# Patient Record
Sex: FEMALE | Race: BLACK OR AFRICAN AMERICAN | NOT HISPANIC OR LATINO
[De-identification: names, ages, dates, MRNs, and addresses within clinical notes are randomized per-mention and may not be internally consistent; named-entity substitution may affect disease eponyms.]

---

## 2019-05-16 PROBLEM — Z00.00 ENCOUNTER FOR PREVENTIVE HEALTH EXAMINATION: Status: ACTIVE | Noted: 2019-05-16

## 2019-05-22 ENCOUNTER — RESULT REVIEW (OUTPATIENT)
Age: 24
End: 2019-05-22

## 2019-05-22 ENCOUNTER — APPOINTMENT (OUTPATIENT)
Dept: OBGYN | Facility: CLINIC | Age: 24
End: 2019-05-22
Payer: MEDICAID

## 2019-05-22 ENCOUNTER — OUTPATIENT (OUTPATIENT)
Dept: OUTPATIENT SERVICES | Facility: HOSPITAL | Age: 24
LOS: 1 days | End: 2019-05-22
Payer: MEDICAID

## 2019-05-22 ENCOUNTER — NON-APPOINTMENT (OUTPATIENT)
Age: 24
End: 2019-05-22

## 2019-05-22 VITALS
DIASTOLIC BLOOD PRESSURE: 77 MMHG | HEIGHT: 65 IN | BODY MASS INDEX: 40.82 KG/M2 | SYSTOLIC BLOOD PRESSURE: 117 MMHG | WEIGHT: 245 LBS

## 2019-05-22 DIAGNOSIS — Z86.59 PERSONAL HISTORY OF OTHER MENTAL AND BEHAVIORAL DISORDERS: ICD-10-CM

## 2019-05-22 DIAGNOSIS — Z34.00 ENCOUNTER FOR SUPERVISION OF NORMAL FIRST PREGNANCY, UNSPECIFIED TRIMESTER: ICD-10-CM

## 2019-05-22 DIAGNOSIS — Z87.09 PERSONAL HISTORY OF OTHER DISEASES OF THE RESPIRATORY SYSTEM: ICD-10-CM

## 2019-05-22 PROCEDURE — 83020 HEMOGLOBIN ELECTROPHORESIS: CPT

## 2019-05-22 PROCEDURE — 99203 OFFICE O/P NEW LOW 30 MIN: CPT | Mod: NC

## 2019-05-22 PROCEDURE — 87340 HEPATITIS B SURFACE AG IA: CPT

## 2019-05-22 PROCEDURE — 81001 URINALYSIS AUTO W/SCOPE: CPT

## 2019-05-22 PROCEDURE — 36415 COLL VENOUS BLD VENIPUNCTURE: CPT | Mod: NC

## 2019-05-22 PROCEDURE — 86787 VARICELLA-ZOSTER ANTIBODY: CPT

## 2019-05-22 PROCEDURE — 86762 RUBELLA ANTIBODY: CPT

## 2019-05-22 PROCEDURE — 83036 HEMOGLOBIN GLYCOSYLATED A1C: CPT

## 2019-05-22 PROCEDURE — 81099 UNLISTED URINALYSIS PX: CPT | Mod: NC

## 2019-05-22 PROCEDURE — 81220 CFTR GENE COM VARIANTS: CPT

## 2019-05-22 PROCEDURE — 83655 ASSAY OF LEAD: CPT

## 2019-05-22 PROCEDURE — 81025 URINE PREGNANCY TEST: CPT | Mod: NC

## 2019-05-22 PROCEDURE — 83020 HEMOGLOBIN ELECTROPHORESIS: CPT | Mod: 26

## 2019-05-22 PROCEDURE — 86780 TREPONEMA PALLIDUM: CPT

## 2019-05-22 PROCEDURE — 86850 RBC ANTIBODY SCREEN: CPT

## 2019-05-22 PROCEDURE — 87086 URINE CULTURE/COLONY COUNT: CPT

## 2019-05-22 PROCEDURE — 86901 BLOOD TYPING SEROLOGIC RH(D): CPT

## 2019-05-22 PROCEDURE — 81025 URINE PREGNANCY TEST: CPT

## 2019-05-22 PROCEDURE — G0463: CPT

## 2019-05-22 PROCEDURE — 36415 COLL VENOUS BLD VENIPUNCTURE: CPT

## 2019-05-22 PROCEDURE — 81003 URINALYSIS AUTO W/O SCOPE: CPT

## 2019-05-22 PROCEDURE — 87389 HIV-1 AG W/HIV-1&-2 AB AG IA: CPT

## 2019-05-22 PROCEDURE — 86480 TB TEST CELL IMMUN MEASURE: CPT

## 2019-05-22 PROCEDURE — 86900 BLOOD TYPING SEROLOGIC ABO: CPT

## 2019-05-22 RX ORDER — ALBUTEROL 90 MCG
AEROSOL (GRAM) INHALATION
Refills: 0 | Status: ACTIVE | COMMUNITY

## 2019-05-22 RX ORDER — PNV NO.95/FERROUS FUM/FOLIC AC 28MG-0.8MG
TABLET ORAL
Refills: 0 | Status: ACTIVE | COMMUNITY

## 2019-05-23 LAB
APPEARANCE UR: CLEAR — SIGNIFICANT CHANGE UP
BASOPHILS # BLD AUTO: 0.06 K/UL — SIGNIFICANT CHANGE UP (ref 0–0.2)
BASOPHILS NFR BLD AUTO: 0.8 % — SIGNIFICANT CHANGE UP (ref 0–2)
BILIRUB UR-MCNC: NEGATIVE — SIGNIFICANT CHANGE UP
COLOR SPEC: YELLOW — SIGNIFICANT CHANGE UP
CULTURE RESULTS: SIGNIFICANT CHANGE UP
DIFF PNL FLD: NEGATIVE — SIGNIFICANT CHANGE UP
EOSINOPHIL # BLD AUTO: 0.04 K/UL — SIGNIFICANT CHANGE UP (ref 0–0.5)
EOSINOPHIL NFR BLD AUTO: 0.5 % — SIGNIFICANT CHANGE UP (ref 0–6)
GLUCOSE UR QL: NEGATIVE — SIGNIFICANT CHANGE UP
HBA1C BLD-MCNC: 5.1 % — SIGNIFICANT CHANGE UP (ref 4–5.6)
HBV SURFACE AG SER-ACNC: SIGNIFICANT CHANGE UP
HCT VFR BLD CALC: 35.3 % — SIGNIFICANT CHANGE UP (ref 34.5–45)
HGB BLD-MCNC: 10.4 G/DL — LOW (ref 11.5–15.5)
HIV 1+2 AB+HIV1 P24 AG SERPL QL IA: SIGNIFICANT CHANGE UP
IMM GRANULOCYTES NFR BLD AUTO: 0.1 % — SIGNIFICANT CHANGE UP (ref 0–1.5)
KETONES UR-MCNC: ABNORMAL
LEUKOCYTE ESTERASE UR-ACNC: ABNORMAL
LYMPHOCYTES # BLD AUTO: 1.43 K/UL — SIGNIFICANT CHANGE UP (ref 1–3.3)
LYMPHOCYTES # BLD AUTO: 19.4 % — SIGNIFICANT CHANGE UP (ref 13–44)
MCHC RBC-ENTMCNC: 26.1 PG — LOW (ref 27–34)
MCHC RBC-ENTMCNC: 29.5 GM/DL — LOW (ref 32–36)
MCV RBC AUTO: 88.5 FL — SIGNIFICANT CHANGE UP (ref 80–100)
MONOCYTES # BLD AUTO: 0.77 K/UL — SIGNIFICANT CHANGE UP (ref 0–0.9)
MONOCYTES NFR BLD AUTO: 10.5 % — SIGNIFICANT CHANGE UP (ref 2–14)
NEUTROPHILS # BLD AUTO: 5.05 K/UL — SIGNIFICANT CHANGE UP (ref 1.8–7.4)
NEUTROPHILS NFR BLD AUTO: 68.7 % — SIGNIFICANT CHANGE UP (ref 43–77)
NITRITE UR-MCNC: NEGATIVE — SIGNIFICANT CHANGE UP
PH UR: 6 — SIGNIFICANT CHANGE UP (ref 5–8)
PLATELET # BLD AUTO: 381 K/UL — SIGNIFICANT CHANGE UP (ref 150–400)
PROT UR-MCNC: SIGNIFICANT CHANGE UP
RBC # BLD: 3.99 M/UL — SIGNIFICANT CHANGE UP (ref 3.8–5.2)
RBC # FLD: 19.3 % — HIGH (ref 10.3–14.5)
RUBV IGG SER-ACNC: 2.3 INDEX — SIGNIFICANT CHANGE UP
RUBV IGG SER-IMP: POSITIVE — SIGNIFICANT CHANGE UP
SP GR SPEC: 1.03 — HIGH (ref 1.01–1.02)
SPECIMEN SOURCE: SIGNIFICANT CHANGE UP
T PALLIDUM AB TITR SER: NEGATIVE — SIGNIFICANT CHANGE UP
UROBILINOGEN FLD QL: SIGNIFICANT CHANGE UP
VZV IGG FLD QL IA: 746.5 INDEX — SIGNIFICANT CHANGE UP
VZV IGG FLD QL IA: POSITIVE — SIGNIFICANT CHANGE UP
WBC # BLD: 7.36 K/UL — SIGNIFICANT CHANGE UP (ref 3.8–10.5)
WBC # FLD AUTO: 7.36 K/UL — SIGNIFICANT CHANGE UP (ref 3.8–10.5)

## 2019-05-24 DIAGNOSIS — Z3A.09 9 WEEKS GESTATION OF PREGNANCY: ICD-10-CM

## 2019-05-24 DIAGNOSIS — Z34.01 ENCOUNTER FOR SUPERVISION OF NORMAL FIRST PREGNANCY, FIRST TRIMESTER: ICD-10-CM

## 2019-05-24 LAB
HEMOGLOBIN INTERPRETATION: SIGNIFICANT CHANGE UP
HGB A MFR BLD: 97.6 % — SIGNIFICANT CHANGE UP (ref 95.8–98)
HGB A2 MFR BLD: 2.4 % — SIGNIFICANT CHANGE UP (ref 2–3.2)
LEAD BLD-MCNC: SIGNIFICANT CHANGE UP UG/DL (ref 0–4)

## 2019-05-28 LAB — CYSTIC FIBROSIS EXPANDED PANEL: SIGNIFICANT CHANGE UP

## 2019-05-29 LAB
GAMMA INTERFERON BACKGROUND BLD IA-ACNC: 0.01 IU/ML — SIGNIFICANT CHANGE UP
M TB IFN-G BLD-IMP: NEGATIVE — SIGNIFICANT CHANGE UP
M TB IFN-G CD4+ BCKGRND COR BLD-ACNC: 0 IU/ML — SIGNIFICANT CHANGE UP
M TB IFN-G CD4+CD8+ BCKGRND COR BLD-ACNC: 0.01 IU/ML — SIGNIFICANT CHANGE UP
QUANT TB PLUS MITOGEN MINUS NIL: 8.45 IU/ML — SIGNIFICANT CHANGE UP

## 2019-06-14 ENCOUNTER — ASOB RESULT (OUTPATIENT)
Age: 24
End: 2019-06-14

## 2019-06-14 ENCOUNTER — APPOINTMENT (OUTPATIENT)
Dept: ANTEPARTUM | Facility: CLINIC | Age: 24
End: 2019-06-14
Payer: MEDICAID

## 2019-06-14 PROCEDURE — 76801 OB US < 14 WKS SINGLE FETUS: CPT

## 2019-06-14 PROCEDURE — 76813 OB US NUCHAL MEAS 1 GEST: CPT

## 2019-06-14 PROCEDURE — 36416 COLLJ CAPILLARY BLOOD SPEC: CPT

## 2019-06-19 ENCOUNTER — OUTPATIENT (OUTPATIENT)
Dept: OUTPATIENT SERVICES | Facility: HOSPITAL | Age: 24
LOS: 1 days | End: 2019-06-19
Payer: MEDICAID

## 2019-06-19 ENCOUNTER — APPOINTMENT (OUTPATIENT)
Dept: OBGYN | Facility: CLINIC | Age: 24
End: 2019-06-19
Payer: MEDICAID

## 2019-06-19 ENCOUNTER — NON-APPOINTMENT (OUTPATIENT)
Age: 24
End: 2019-06-19

## 2019-06-19 VITALS
DIASTOLIC BLOOD PRESSURE: 74 MMHG | WEIGHT: 248 LBS | HEIGHT: 65 IN | BODY MASS INDEX: 41.32 KG/M2 | SYSTOLIC BLOOD PRESSURE: 119 MMHG

## 2019-06-19 DIAGNOSIS — Z3A.09 9 WEEKS GESTATION OF PREGNANCY: ICD-10-CM

## 2019-06-19 DIAGNOSIS — Z34.00 ENCOUNTER FOR SUPERVISION OF NORMAL FIRST PREGNANCY, UNSPECIFIED TRIMESTER: ICD-10-CM

## 2019-06-19 PROCEDURE — 87086 URINE CULTURE/COLONY COUNT: CPT

## 2019-06-19 PROCEDURE — 99213 OFFICE O/P EST LOW 20 MIN: CPT | Mod: NC

## 2019-06-19 PROCEDURE — 81420 FETAL CHRMOML ANEUPLOIDY: CPT

## 2019-06-19 PROCEDURE — 81099 UNLISTED URINALYSIS PX: CPT | Mod: NC

## 2019-06-19 PROCEDURE — 36415 COLL VENOUS BLD VENIPUNCTURE: CPT | Mod: NC

## 2019-06-19 PROCEDURE — G0463: CPT

## 2019-06-19 PROCEDURE — 81003 URINALYSIS AUTO W/O SCOPE: CPT

## 2019-06-19 PROCEDURE — 36415 COLL VENOUS BLD VENIPUNCTURE: CPT

## 2019-06-20 LAB
CULTURE RESULTS: NO GROWTH — SIGNIFICANT CHANGE UP
SPECIMEN SOURCE: SIGNIFICANT CHANGE UP

## 2019-06-21 DIAGNOSIS — Z3A.13 13 WEEKS GESTATION OF PREGNANCY: ICD-10-CM

## 2019-06-21 DIAGNOSIS — Z34.01 ENCOUNTER FOR SUPERVISION OF NORMAL FIRST PREGNANCY, FIRST TRIMESTER: ICD-10-CM

## 2019-06-24 LAB
CHR 21 TS BLD/T QL: NEGATIVE — SIGNIFICANT CHANGE UP
CHROMOSOME 13 ANEUPLOIDY: NEGATIVE — SIGNIFICANT CHANGE UP
CHROMOSOME 18 ANEUPLOIDY: NEGATIVE — SIGNIFICANT CHANGE UP
SEX CHROMOSOME ANALYSIS: SIGNIFICANT CHANGE UP

## 2019-07-16 ENCOUNTER — APPOINTMENT (OUTPATIENT)
Dept: OBGYN | Facility: CLINIC | Age: 24
End: 2019-07-16

## 2019-07-19 ENCOUNTER — APPOINTMENT (OUTPATIENT)
Dept: OBGYN | Facility: CLINIC | Age: 24
End: 2019-07-19
Payer: MEDICAID

## 2019-07-19 ENCOUNTER — OUTPATIENT (OUTPATIENT)
Dept: OUTPATIENT SERVICES | Facility: HOSPITAL | Age: 24
LOS: 1 days | End: 2019-07-19
Payer: MEDICAID

## 2019-07-19 ENCOUNTER — NON-APPOINTMENT (OUTPATIENT)
Age: 24
End: 2019-07-19

## 2019-07-19 VITALS
RESPIRATION RATE: 18 BRPM | DIASTOLIC BLOOD PRESSURE: 78 MMHG | WEIGHT: 247 LBS | HEART RATE: 85 BPM | BODY MASS INDEX: 41.15 KG/M2 | OXYGEN SATURATION: 100 % | HEIGHT: 65 IN | SYSTOLIC BLOOD PRESSURE: 118 MMHG

## 2019-07-19 DIAGNOSIS — Z34.00 ENCOUNTER FOR SUPERVISION OF NORMAL FIRST PREGNANCY, UNSPECIFIED TRIMESTER: ICD-10-CM

## 2019-07-19 DIAGNOSIS — E66.9 OBESITY, UNSPECIFIED: ICD-10-CM

## 2019-07-19 DIAGNOSIS — Z34.01 ENCOUNTER FOR SUPERVISION OF NORMAL FIRST PREGNANCY, FIRST TRIMESTER: ICD-10-CM

## 2019-07-19 PROCEDURE — 84702 CHORIONIC GONADOTROPIN TEST: CPT

## 2019-07-19 PROCEDURE — 84704 HCG FREE BETACHAIN TEST: CPT

## 2019-07-19 PROCEDURE — 81003 URINALYSIS AUTO W/O SCOPE: CPT

## 2019-07-19 PROCEDURE — 82677 ASSAY OF ESTRIOL: CPT

## 2019-07-19 PROCEDURE — 82105 ALPHA-FETOPROTEIN SERUM: CPT

## 2019-07-19 PROCEDURE — 86336 INHIBIN A: CPT

## 2019-07-19 PROCEDURE — G0463: CPT

## 2019-07-19 PROCEDURE — 99213 OFFICE O/P EST LOW 20 MIN: CPT | Mod: NC

## 2019-07-19 PROCEDURE — 36415 COLL VENOUS BLD VENIPUNCTURE: CPT

## 2019-07-19 PROCEDURE — 36415 COLL VENOUS BLD VENIPUNCTURE: CPT | Mod: NC

## 2019-07-19 RX ORDER — PRENATAL VIT NO.130/IRON/FOLIC 27MG-0.8MG
28-0.8 TABLET ORAL DAILY
Qty: 30 | Refills: 1 | Status: ACTIVE | COMMUNITY
Start: 2019-07-19 | End: 1900-01-01

## 2019-07-22 LAB
1ST TRIMESTER DATA: SIGNIFICANT CHANGE UP
2ND TRIMESTER DATA: SIGNIFICANT CHANGE UP
AFP AMN-MCNC: SIGNIFICANT CHANGE UP
AFP SERPL-ACNC: SIGNIFICANT CHANGE UP
B-HCG FREE SERPL-MCNC: SIGNIFICANT CHANGE UP
B-HCG FREE SERPL-MCNC: SIGNIFICANT CHANGE UP
CLINICAL BIOCHEMIST REVIEW: SIGNIFICANT CHANGE UP
DEMOGRAPHIC DATA: SIGNIFICANT CHANGE UP
INHIBIN A SERPL-MCNC: SIGNIFICANT CHANGE UP
NT: SIGNIFICANT CHANGE UP
PAPP-A SERPL-ACNC: SIGNIFICANT CHANGE UP
SCREEN-FOOTER: SIGNIFICANT CHANGE UP
U ESTRIOL SERPL-SCNC: SIGNIFICANT CHANGE UP

## 2019-07-23 DIAGNOSIS — Z34.02 ENCOUNTER FOR SUPERVISION OF NORMAL FIRST PREGNANCY, SECOND TRIMESTER: ICD-10-CM

## 2019-07-23 DIAGNOSIS — Z3A.18 18 WEEKS GESTATION OF PREGNANCY: ICD-10-CM

## 2019-07-23 DIAGNOSIS — E66.9 OBESITY, UNSPECIFIED: ICD-10-CM

## 2019-08-13 ENCOUNTER — NON-APPOINTMENT (OUTPATIENT)
Age: 24
End: 2019-08-13

## 2019-08-13 ENCOUNTER — APPOINTMENT (OUTPATIENT)
Dept: OBGYN | Facility: CLINIC | Age: 24
End: 2019-08-13
Payer: MEDICAID

## 2019-08-13 VITALS
SYSTOLIC BLOOD PRESSURE: 120 MMHG | BODY MASS INDEX: 41.82 KG/M2 | HEIGHT: 65 IN | WEIGHT: 251 LBS | DIASTOLIC BLOOD PRESSURE: 60 MMHG

## 2019-08-13 DIAGNOSIS — Z32.01 ENCOUNTER FOR PREGNANCY TEST, RESULT POSITIVE: ICD-10-CM

## 2019-08-13 DIAGNOSIS — Z11.3 ENCOUNTER FOR SCREENING FOR INFECTIONS WITH A PREDOMINANTLY SEXUAL MODE OF TRANSMISSION: ICD-10-CM

## 2019-08-13 PROCEDURE — 0501F PRENATAL FLOW SHEET: CPT

## 2019-08-13 PROCEDURE — 81025 URINE PREGNANCY TEST: CPT

## 2019-08-13 PROCEDURE — 81003 URINALYSIS AUTO W/O SCOPE: CPT | Mod: QW

## 2019-08-14 ENCOUNTER — APPOINTMENT (OUTPATIENT)
Dept: ANTEPARTUM | Facility: CLINIC | Age: 24
End: 2019-08-14

## 2019-08-14 LAB
BILIRUB UR QL STRIP: NORMAL
C TRACH RRNA SPEC QL NAA+PROBE: NOT DETECTED
GLUCOSE UR-MCNC: NORMAL
HCG UR QL: 0.2 EU/DL
HCG UR QL: POSITIVE
HGB UR QL STRIP.AUTO: NORMAL
KETONES UR-MCNC: NORMAL
LEUKOCYTE ESTERASE UR QL STRIP: NORMAL
N GONORRHOEA RRNA SPEC QL NAA+PROBE: NOT DETECTED
NITRITE UR QL STRIP: NORMAL
PH UR STRIP: 6.5
PROT UR STRIP-MCNC: NORMAL
SOURCE AMPLIFICATION: NORMAL
SOURCE AMPLIFICATION: NORMAL
SP GR UR STRIP: 1.01
T VAGINALIS RRNA SPEC QL NAA+PROBE: NOT DETECTED

## 2019-08-15 LAB — BACTERIA UR CULT: NORMAL

## 2019-08-16 ENCOUNTER — APPOINTMENT (OUTPATIENT)
Dept: OBGYN | Facility: CLINIC | Age: 24
End: 2019-08-16

## 2019-09-04 ENCOUNTER — ASOB RESULT (OUTPATIENT)
Age: 24
End: 2019-09-04

## 2019-09-04 ENCOUNTER — APPOINTMENT (OUTPATIENT)
Dept: ANTEPARTUM | Facility: CLINIC | Age: 24
End: 2019-09-04
Payer: MEDICAID

## 2019-09-04 PROCEDURE — 76805 OB US >/= 14 WKS SNGL FETUS: CPT

## 2019-09-10 ENCOUNTER — APPOINTMENT (OUTPATIENT)
Dept: OBGYN | Facility: CLINIC | Age: 24
End: 2019-09-10

## 2019-09-16 ENCOUNTER — APPOINTMENT (OUTPATIENT)
Dept: OBGYN | Facility: CLINIC | Age: 24
End: 2019-09-16

## 2019-09-17 ENCOUNTER — APPOINTMENT (OUTPATIENT)
Dept: OBGYN | Facility: CLINIC | Age: 24
End: 2019-09-17
Payer: MEDICAID

## 2019-09-17 ENCOUNTER — NON-APPOINTMENT (OUTPATIENT)
Age: 24
End: 2019-09-17

## 2019-09-17 VITALS
WEIGHT: 258 LBS | BODY MASS INDEX: 42.99 KG/M2 | DIASTOLIC BLOOD PRESSURE: 76 MMHG | SYSTOLIC BLOOD PRESSURE: 120 MMHG | HEIGHT: 65 IN

## 2019-09-17 DIAGNOSIS — Z34.02 ENCOUNTER FOR SUPERVISION OF NORMAL FIRST PREGNANCY, SECOND TRIMESTER: ICD-10-CM

## 2019-09-17 PROCEDURE — 0502F SUBSEQUENT PRENATAL CARE: CPT

## 2019-09-19 ENCOUNTER — APPOINTMENT (OUTPATIENT)
Dept: OBGYN | Facility: CLINIC | Age: 24
End: 2019-09-19

## 2019-09-24 LAB — BACTERIA UR CULT: ABNORMAL

## 2019-10-15 ENCOUNTER — NON-APPOINTMENT (OUTPATIENT)
Age: 24
End: 2019-10-15

## 2019-10-15 ENCOUNTER — APPOINTMENT (OUTPATIENT)
Dept: OBGYN | Facility: CLINIC | Age: 24
End: 2019-10-15
Payer: MEDICAID

## 2019-10-15 VITALS
WEIGHT: 266 LBS | BODY MASS INDEX: 44.32 KG/M2 | DIASTOLIC BLOOD PRESSURE: 70 MMHG | SYSTOLIC BLOOD PRESSURE: 122 MMHG | HEIGHT: 65 IN

## 2019-10-15 LAB
BILIRUB UR QL STRIP: NORMAL
GLUCOSE UR-MCNC: NORMAL
HCG UR QL: 0.2 EU/DL
HGB UR QL STRIP.AUTO: NORMAL
KETONES UR-MCNC: NORMAL
LEUKOCYTE ESTERASE UR QL STRIP: NORMAL
NITRITE UR QL STRIP: NORMAL
PH UR STRIP: 6.5
PROT UR STRIP-MCNC: NORMAL
SP GR UR STRIP: 1.02

## 2019-10-15 PROCEDURE — 36415 COLL VENOUS BLD VENIPUNCTURE: CPT

## 2019-10-15 PROCEDURE — 0502F SUBSEQUENT PRENATAL CARE: CPT

## 2019-10-16 DIAGNOSIS — D64.9 ANEMIA, UNSPECIFIED: ICD-10-CM

## 2019-10-16 LAB
BASOPHILS # BLD AUTO: 0.03 K/UL
BASOPHILS NFR BLD AUTO: 0.3 %
EOSINOPHIL # BLD AUTO: 0.08 K/UL
EOSINOPHIL NFR BLD AUTO: 0.7 %
GLUCOSE 1H P 100 G GLC PO SERPL-MCNC: 81 MG/DL
HCT VFR BLD CALC: 32.7 %
HCV AB SER QL: NONREACTIVE
HCV S/CO RATIO: 0.13 S/CO
HGB BLD-MCNC: 10.5 G/DL
IMM GRANULOCYTES NFR BLD AUTO: 1 %
LYMPHOCYTES # BLD AUTO: 1.51 K/UL
LYMPHOCYTES NFR BLD AUTO: 13.2 %
MAN DIFF?: NORMAL
MCHC RBC-ENTMCNC: 28.8 PG
MCHC RBC-ENTMCNC: 32.1 GM/DL
MCV RBC AUTO: 89.8 FL
MEV IGG FLD QL IA: 212 AU/ML
MEV IGG+IGM SER-IMP: POSITIVE
MONOCYTES # BLD AUTO: 0.91 K/UL
MONOCYTES NFR BLD AUTO: 7.9 %
NEUTROPHILS # BLD AUTO: 8.82 K/UL
NEUTROPHILS NFR BLD AUTO: 76.9 %
PLATELET # BLD AUTO: 292 K/UL
RBC # BLD: 3.64 M/UL
RBC # FLD: 15.9 %
T PALLIDUM AB SER QL IA: NEGATIVE
WBC # FLD AUTO: 11.46 K/UL

## 2019-10-17 LAB
B19V IGG SER QL IA: 5.8 INDEX
B19V IGG+IGM SER-IMP: NORMAL
B19V IGG+IGM SER-IMP: POSITIVE
B19V IGM FLD-ACNC: 0.3
B19V IGM SER-ACNC: NEGATIVE
BACTERIA UR CULT: NORMAL

## 2019-10-17 RX ORDER — GLUC/MSM/COLGN2/HYAL/ANTIARTH3 375-375-20
240 (27 FE) TABLET ORAL DAILY
Qty: 30 | Refills: 3 | Status: ACTIVE | COMMUNITY
Start: 2019-06-19 | End: 1900-01-01

## 2019-10-21 RX ORDER — DOCUSATE SODIUM 100 MG/1
100 CAPSULE ORAL TWICE DAILY
Qty: 60 | Refills: 5 | Status: ACTIVE | COMMUNITY
Start: 2019-10-18 | End: 1900-01-01

## 2019-10-21 RX ORDER — FERROUS SULFATE 325(65) MG
325 (65 FE) TABLET ORAL TWICE DAILY
Qty: 60 | Refills: 5 | Status: ACTIVE | COMMUNITY
Start: 2019-10-18 | End: 1900-01-01

## 2019-11-14 ENCOUNTER — APPOINTMENT (OUTPATIENT)
Dept: ANTEPARTUM | Facility: CLINIC | Age: 24
End: 2019-11-14
Payer: MEDICAID

## 2019-11-14 ENCOUNTER — ASOB RESULT (OUTPATIENT)
Age: 24
End: 2019-11-14

## 2019-11-14 PROCEDURE — 76811 OB US DETAILED SNGL FETUS: CPT

## 2019-11-14 PROCEDURE — 76819 FETAL BIOPHYS PROFIL W/O NST: CPT

## 2019-11-18 ENCOUNTER — APPOINTMENT (OUTPATIENT)
Dept: OBGYN | Facility: CLINIC | Age: 24
End: 2019-11-18

## 2019-11-25 ENCOUNTER — APPOINTMENT (OUTPATIENT)
Dept: ANTEPARTUM | Facility: CLINIC | Age: 24
End: 2019-11-25

## 2019-12-02 ENCOUNTER — NON-APPOINTMENT (OUTPATIENT)
Age: 24
End: 2019-12-02

## 2019-12-02 ENCOUNTER — APPOINTMENT (OUTPATIENT)
Dept: OBGYN | Facility: CLINIC | Age: 24
End: 2019-12-02

## 2019-12-02 VITALS — SYSTOLIC BLOOD PRESSURE: 130 MMHG | DIASTOLIC BLOOD PRESSURE: 60 MMHG

## 2019-12-02 VITALS
BODY MASS INDEX: 44.98 KG/M2 | WEIGHT: 270 LBS | HEIGHT: 65 IN | SYSTOLIC BLOOD PRESSURE: 148 MMHG | DIASTOLIC BLOOD PRESSURE: 80 MMHG

## 2019-12-04 LAB
BASOPHILS # BLD AUTO: 0.04 K/UL
BASOPHILS NFR BLD AUTO: 0.4 %
BILIRUB UR QL STRIP: NORMAL
EOSINOPHIL # BLD AUTO: 0.23 K/UL
EOSINOPHIL NFR BLD AUTO: 2.5 %
GLUCOSE UR-MCNC: NORMAL
GP B STREP DNA SPEC QL NAA+PROBE: NORMAL
GP B STREP DNA SPEC QL NAA+PROBE: NOT DETECTED
HCG UR QL: 0.2 EU/DL
HCT VFR BLD CALC: 34.9 %
HGB BLD-MCNC: 10.9 G/DL
HGB UR QL STRIP.AUTO: NORMAL
HIV1+2 AB SPEC QL IA.RAPID: NONREACTIVE
IMM GRANULOCYTES NFR BLD AUTO: 0.6 %
KETONES UR-MCNC: NORMAL
LEUKOCYTE ESTERASE UR QL STRIP: NORMAL
LYMPHOCYTES # BLD AUTO: 1.18 K/UL
LYMPHOCYTES NFR BLD AUTO: 12.7 %
MAN DIFF?: NORMAL
MCHC RBC-ENTMCNC: 27.6 PG
MCHC RBC-ENTMCNC: 31.2 GM/DL
MCV RBC AUTO: 88.4 FL
MONOCYTES # BLD AUTO: 0.66 K/UL
MONOCYTES NFR BLD AUTO: 7.1 %
NEUTROPHILS # BLD AUTO: 7.12 K/UL
NEUTROPHILS NFR BLD AUTO: 76.7 %
NITRITE UR QL STRIP: NORMAL
PH UR STRIP: 7
PLATELET # BLD AUTO: 317 K/UL
PROT UR STRIP-MCNC: NORMAL
RBC # BLD: 3.95 M/UL
RBC # FLD: 15.3 %
SOURCE GBS: NORMAL
SP GR UR STRIP: 1.01
WBC # FLD AUTO: 9.29 K/UL

## 2019-12-07 ENCOUNTER — EMERGENCY (EMERGENCY)
Facility: HOSPITAL | Age: 24
LOS: 1 days | Discharge: STILL ADMITTED OR ADMINISTRATIVE | End: 2019-12-07
Admitting: EMERGENCY MEDICINE

## 2019-12-07 ENCOUNTER — OUTPATIENT (OUTPATIENT)
Dept: INPATIENT UNIT | Facility: HOSPITAL | Age: 24
LOS: 1 days | Discharge: HOME | End: 2019-12-07
Payer: MEDICAID

## 2019-12-07 VITALS
TEMPERATURE: 98 F | DIASTOLIC BLOOD PRESSURE: 73 MMHG | OXYGEN SATURATION: 100 % | HEART RATE: 97 BPM | RESPIRATION RATE: 18 BRPM | SYSTOLIC BLOOD PRESSURE: 133 MMHG

## 2019-12-07 VITALS
OXYGEN SATURATION: 100 % | HEART RATE: 90 BPM | RESPIRATION RATE: 16 BRPM | DIASTOLIC BLOOD PRESSURE: 69 MMHG | TEMPERATURE: 98 F | SYSTOLIC BLOOD PRESSURE: 140 MMHG

## 2019-12-07 VITALS — DIASTOLIC BLOOD PRESSURE: 54 MMHG | SYSTOLIC BLOOD PRESSURE: 122 MMHG | HEART RATE: 82 BPM

## 2019-12-07 DIAGNOSIS — Z98.890 OTHER SPECIFIED POSTPROCEDURAL STATES: Chronic | ICD-10-CM

## 2019-12-07 DIAGNOSIS — Z3A.00 WEEKS OF GESTATION OF PREGNANCY NOT SPECIFIED: ICD-10-CM

## 2019-12-07 DIAGNOSIS — O26.899 OTHER SPECIFIED PREGNANCY RELATED CONDITIONS, UNSPECIFIED TRIMESTER: ICD-10-CM

## 2019-12-07 LAB
ALBUMIN SERPL ELPH-MCNC: 3 G/DL — LOW (ref 3.3–5)
ALP SERPL-CCNC: 101 U/L — SIGNIFICANT CHANGE UP (ref 40–120)
ALT FLD-CCNC: 8 U/L — SIGNIFICANT CHANGE UP (ref 4–33)
ANION GAP SERPL CALC-SCNC: 11 MMO/L — SIGNIFICANT CHANGE UP (ref 7–14)
APPEARANCE UR: CLEAR — SIGNIFICANT CHANGE UP
APTT BLD: 24.6 SEC — LOW (ref 27.5–36.3)
AST SERPL-CCNC: 17 U/L — SIGNIFICANT CHANGE UP (ref 4–32)
BACTERIA # UR AUTO: SIGNIFICANT CHANGE UP
BASOPHILS # BLD AUTO: 0.04 K/UL — SIGNIFICANT CHANGE UP (ref 0–0.2)
BASOPHILS NFR BLD AUTO: 0.4 % — SIGNIFICANT CHANGE UP (ref 0–2)
BILIRUB SERPL-MCNC: < 0.2 MG/DL — LOW (ref 0.2–1.2)
BILIRUB UR-MCNC: NEGATIVE — SIGNIFICANT CHANGE UP
BLOOD UR QL VISUAL: NEGATIVE — SIGNIFICANT CHANGE UP
BUN SERPL-MCNC: 7 MG/DL — SIGNIFICANT CHANGE UP (ref 7–23)
CALCIUM SERPL-MCNC: 8.9 MG/DL — SIGNIFICANT CHANGE UP (ref 8.4–10.5)
CHLORIDE SERPL-SCNC: 106 MMOL/L — SIGNIFICANT CHANGE UP (ref 98–107)
CO2 SERPL-SCNC: 19 MMOL/L — LOW (ref 22–31)
COLOR SPEC: SIGNIFICANT CHANGE UP
CREAT ?TM UR-MCNC: 116.8 MG/DL — SIGNIFICANT CHANGE UP
CREAT SERPL-MCNC: 0.65 MG/DL — SIGNIFICANT CHANGE UP (ref 0.5–1.3)
EOSINOPHIL # BLD AUTO: 0.07 K/UL — SIGNIFICANT CHANGE UP (ref 0–0.5)
EOSINOPHIL NFR BLD AUTO: 0.7 % — SIGNIFICANT CHANGE UP (ref 0–6)
FIBRINOGEN PPP-MCNC: 665.9 MG/DL — HIGH (ref 350–510)
GLUCOSE SERPL-MCNC: 86 MG/DL — SIGNIFICANT CHANGE UP (ref 70–99)
GLUCOSE UR-MCNC: NEGATIVE — SIGNIFICANT CHANGE UP
HCT VFR BLD CALC: 31.5 % — LOW (ref 34.5–45)
HGB BLD-MCNC: 10.4 G/DL — LOW (ref 11.5–15.5)
HYALINE CASTS # UR AUTO: NEGATIVE — SIGNIFICANT CHANGE UP
IMM GRANULOCYTES NFR BLD AUTO: 1 % — SIGNIFICANT CHANGE UP (ref 0–1.5)
INR BLD: 1.01 — SIGNIFICANT CHANGE UP (ref 0.88–1.17)
KETONES UR-MCNC: NEGATIVE — SIGNIFICANT CHANGE UP
LDH SERPL L TO P-CCNC: 207 U/L — SIGNIFICANT CHANGE UP (ref 135–225)
LEUKOCYTE ESTERASE UR-ACNC: SIGNIFICANT CHANGE UP
LYMPHOCYTES # BLD AUTO: 1.58 K/UL — SIGNIFICANT CHANGE UP (ref 1–3.3)
LYMPHOCYTES # BLD AUTO: 15.2 % — SIGNIFICANT CHANGE UP (ref 13–44)
MCHC RBC-ENTMCNC: 28.3 PG — SIGNIFICANT CHANGE UP (ref 27–34)
MCHC RBC-ENTMCNC: 33 % — SIGNIFICANT CHANGE UP (ref 32–36)
MCV RBC AUTO: 85.8 FL — SIGNIFICANT CHANGE UP (ref 80–100)
MONOCYTES # BLD AUTO: 1.07 K/UL — HIGH (ref 0–0.9)
MONOCYTES NFR BLD AUTO: 10.3 % — SIGNIFICANT CHANGE UP (ref 2–14)
NEUTROPHILS # BLD AUTO: 7.51 K/UL — HIGH (ref 1.8–7.4)
NEUTROPHILS NFR BLD AUTO: 72.4 % — SIGNIFICANT CHANGE UP (ref 43–77)
NITRITE UR-MCNC: NEGATIVE — SIGNIFICANT CHANGE UP
NRBC # FLD: 0 K/UL — SIGNIFICANT CHANGE UP (ref 0–0)
PH UR: 7 — SIGNIFICANT CHANGE UP (ref 5–8)
PLATELET # BLD AUTO: 292 K/UL — SIGNIFICANT CHANGE UP (ref 150–400)
PMV BLD: 10.8 FL — SIGNIFICANT CHANGE UP (ref 7–13)
POTASSIUM SERPL-MCNC: 3.9 MMOL/L — SIGNIFICANT CHANGE UP (ref 3.5–5.3)
POTASSIUM SERPL-SCNC: 3.9 MMOL/L — SIGNIFICANT CHANGE UP (ref 3.5–5.3)
PROT SERPL-MCNC: 6.7 G/DL — SIGNIFICANT CHANGE UP (ref 6–8.3)
PROT UR-MCNC: 10 — SIGNIFICANT CHANGE UP
PROT UR-MCNC: 13.1 MG/DL — SIGNIFICANT CHANGE UP
PROTHROM AB SERPL-ACNC: 11.2 SEC — SIGNIFICANT CHANGE UP (ref 9.8–13.1)
RBC # BLD: 3.67 M/UL — LOW (ref 3.8–5.2)
RBC # FLD: 15.1 % — HIGH (ref 10.3–14.5)
RBC CASTS # UR COMP ASSIST: SIGNIFICANT CHANGE UP (ref 0–?)
SODIUM SERPL-SCNC: 136 MMOL/L — SIGNIFICANT CHANGE UP (ref 135–145)
SP GR SPEC: 1.02 — SIGNIFICANT CHANGE UP (ref 1–1.04)
SQUAMOUS # UR AUTO: SIGNIFICANT CHANGE UP
URATE SERPL-MCNC: 3.8 MG/DL — SIGNIFICANT CHANGE UP (ref 2.5–7)
UROBILINOGEN FLD QL: NORMAL — SIGNIFICANT CHANGE UP
WBC # BLD: 10.37 K/UL — SIGNIFICANT CHANGE UP (ref 3.8–10.5)
WBC # FLD AUTO: 10.37 K/UL — SIGNIFICANT CHANGE UP (ref 3.8–10.5)
WBC UR QL: HIGH (ref 0–?)

## 2019-12-07 PROCEDURE — 99202 OFFICE O/P NEW SF 15 MIN: CPT

## 2019-12-07 PROCEDURE — 59025 FETAL NON-STRESS TEST: CPT | Mod: 26

## 2019-12-07 RX ORDER — SODIUM CHLORIDE 9 MG/ML
3 INJECTION INTRAMUSCULAR; INTRAVENOUS; SUBCUTANEOUS EVERY 8 HOURS
Refills: 0 | Status: DISCONTINUED | OUTPATIENT
Start: 2019-12-07 | End: 2020-01-09

## 2019-12-07 RX ORDER — ACETAMINOPHEN 500 MG
975 TABLET ORAL ONCE
Refills: 0 | Status: COMPLETED | OUTPATIENT
Start: 2019-12-07 | End: 2019-12-07

## 2019-12-07 RX ADMIN — Medication 975 MILLIGRAM(S): at 02:12

## 2019-12-07 NOTE — ED ADULT TRIAGE NOTE - CHIEF COMPLAINT QUOTE
contractions    pt is approximately  38 weeks pregnant... 1st pregnancy.. having contractions 15 minutes apart. water did not break. no bleeding.  due date dec 20. ob-gyn md is dr laurent.  spoke to no bao.. pt for transfer to l&d

## 2019-12-07 NOTE — OB PROVIDER TRIAGE NOTE - ADDITIONAL INSTRUCTIONS
patient cleared for discharge home   labor precautions reviewed  fetal kick counts emphasized   pre-eclampsia signs and symptoms reviewed and emphasized  verbalized understanding  patient to f/u with provider as scheduled on Monday 12/9/2019

## 2019-12-07 NOTE — OB PROVIDER TRIAGE NOTE - HISTORY OF PRESENT ILLNESS
24y.o.   at 38.1week presenting with complaints of contractions since .  Patient reports contractions were initially [id number] 7/10 pain.  Patient reports contractions are presently [id number] and are presently painless. Patient reports pain and discomfort is presently back pain and vaginal discomfort.  Patient reports positive fetal movement, denies lof, denies vaginal bleeding.  Patient also reports that she has been experiencing a headache intermittently throughout the day.  HA pain is presently 7/10pain.  Patient reports that she also experienced headache on Tuesday that was unrelieved by Tylenol relieved by sleep.  Patient was seen in office on monday and was noted to have blood pressure of 165/95.      ap course complications patient denies  patient that she was 2cm on monday 24y.o.   at 38.1week presenting with complaints of contractions since .  Patient reports contractions were initially [id number] 7/10 pain.  Patient reports contractions are presently [id number] and are presently painless. Patient reports pain and discomfort is presently back pain and vaginal discomfort.  Patient reports positive fetal movement, denies lof, denies vaginal bleeding.  Patient also reports that she has been experiencing a headache intermittently throughout the day.  HA pain is presently 7/10pain.  Patient reports that she also experienced headache on Tuesday that was unrelieved by Tylenol relieved by sleep.  Patient was seen in office on monday and was noted to have blood pressure of 165/95.  Patient denies changes in vision, SOB, RUQ, N/V.     ap course complications patient denies  patient that she was 2cm on monday

## 2019-12-07 NOTE — OB PROVIDER TRIAGE NOTE - NSOBPROVIDERNOTE_OBGYN_ALL_OB_FT
pmh: asthma, anemia  psh: left knee surgery   obhx:  gynhx:     Allergies: peanuts; rash    NKDA    Medications  PNV  Iron    Assessment pmh: asthma; patient denies any recent attacks, anemia  psh: left knee surgery   obhx: denies  gynhx: deneis     Allergies: peanuts; rash    NKDA    Medications  PNV  Iron    Assessment  Vital Signs Last 24 Hrs  T(C): 36.9 (07 Dec 2019 00:41), Max: 36.9 (07 Dec 2019 00:41)  T(F): 98.4 (07 Dec 2019 00:41), Max: 98.4 (07 Dec 2019 00:41)  HR: 85 (07 Dec 2019 03:36) (80 - 95)  BP: 134/58 (07 Dec 2019 03:36) (100/53 - 140/69)  BP(mean): --  RR: 16 (07 Dec 2019 00:41) (16 - 16)  SpO2: 100% (07 Dec 2019 00:41) (100% - 100%)    abdomen soft nontender gravid  cat 1 fhr    VE: 0.5/50/-3    BPP 8/8  Cephalic  CRESENCIO: 11.95cm    labs reviewed no evidence of pre-eclampsia at this time  normotensive pressures  patient reports decrease in HA at this time     pending urine culture results       patient cleared for discharge home   labor precautions reviewed  fetal kick counts emphasized   pre-eclampsia signs and symptoms reviewed and emphasized  verbalized understanding  patient to f/u with provider as scheduled on Monday 12/9/2019      Discharge d/w Dr. Gann

## 2019-12-07 NOTE — OB PROVIDER TRIAGE NOTE - NSHPLABSRESULTS_GEN_ALL_CORE
CBC Full  -  ( 07 Dec 2019 01:45 )  WBC Count : 10.37 K/uL  RBC Count : 3.67 M/uL  Hemoglobin : 10.4 g/dL  Hematocrit : 31.5 %  Platelet Count - Automated : 292 K/uL  Mean Cell Volume : 85.8 fL  Mean Cell Hemoglobin : 28.3 pg  Mean Cell Hemoglobin Concentration : 33.0 %  Auto Neutrophil # : 7.51 K/uL  Auto Lymphocyte # : 1.58 K/uL  Auto Monocyte # : 1.07 K/uL  Auto Eosinophil # : 0.07 K/uL  Auto Basophil # : 0.04 K/uL  Auto Neutrophil % : 72.4 %  Auto Lymphocyte % : 15.2 %  Auto Monocyte % : 10.3 %  Auto Eosinophil % : 0.7 %  Auto Basophil % : 0.4 %          136  |  106  |  7   ----------------------------<  86  3.9   |  19<L>  |  0.65    Ca    8.9      07 Dec 2019 01:45    TPro  6.7  /  Alb  3.0<L>  /  TBili  < 0.2<L>  /  DBili  x   /  AST  17  /  ALT  8   /  AlkPhos  101  12-07      Urinalysis Basic - ( 07 Dec 2019 01:46 )    Color: LIGHT YELLOW / Appearance: CLEAR / S.020 / pH: 7.0  Gluc: NEGATIVE / Ketone: NEGATIVE  / Bili: NEGATIVE / Urobili: NORMAL   Blood: NEGATIVE / Protein: 10 / Nitrite: NEGATIVE   Leuk Esterase: LARGE / RBC: 0-2 / WBC 26-50   Sq Epi: OCC / Non Sq Epi: x / Bacteria: FEW      PCR: 0.1

## 2019-12-07 NOTE — OB PROVIDER TRIAGE NOTE - NSHPPHYSICALEXAM_GEN_ALL_CORE
Vital Signs Last 24 Hrs  T(C): 36.9 (07 Dec 2019 00:41), Max: 36.9 (07 Dec 2019 00:41)  T(F): 98.4 (07 Dec 2019 00:41), Max: 98.4 (07 Dec 2019 00:41)  HR: 81 (07 Dec 2019 02:20) (81 - 95)  BP: 125/62 (07 Dec 2019 02:20) (100/53 - 140/69)  BP(mean): --  RR: 16 (07 Dec 2019 00:41) (16 - 16)  SpO2: 100% (07 Dec 2019 00:41) (100% - 100%)    abdomen soft nontender gravid  cat 1 fhr    VE: 0.5/50/-3 Vital Signs Last 24 Hrs  T(C): 36.9 (07 Dec 2019 00:41), Max: 36.9 (07 Dec 2019 00:41)  T(F): 98.4 (07 Dec 2019 00:41), Max: 98.4 (07 Dec 2019 00:41)  HR: 85 (07 Dec 2019 03:36) (80 - 95)  BP: 134/58 (07 Dec 2019 03:36) (100/53 - 140/69)  BP(mean): --  RR: 16 (07 Dec 2019 00:41) (16 - 16)  SpO2: 100% (07 Dec 2019 00:41) (100% - 100%)    abdomen soft nontender gravid  cat 1 fhr    VE: 0.5/50/-3 Vital Signs Last 24 Hrs  T(C): 36.9 (07 Dec 2019 00:41), Max: 36.9 (07 Dec 2019 00:41)  T(F): 98.4 (07 Dec 2019 00:41), Max: 98.4 (07 Dec 2019 00:41)  HR: 85 (07 Dec 2019 03:36) (80 - 95)  BP: 134/58 (07 Dec 2019 03:36) (100/53 - 140/69)  BP(mean): --  RR: 16 (07 Dec 2019 00:41) (16 - 16)  SpO2: 100% (07 Dec 2019 00:41) (100% - 100%)    abdomen soft nontender gravid  cat 1 fhr    VE: 0.5/50/-3    BPP 8/8  Cephalic  CRESENCIO: 11.95cm

## 2019-12-08 LAB
BACTERIA UR CULT: SIGNIFICANT CHANGE UP
SPECIMEN SOURCE: SIGNIFICANT CHANGE UP

## 2019-12-09 ENCOUNTER — APPOINTMENT (OUTPATIENT)
Dept: OBGYN | Facility: CLINIC | Age: 24
End: 2019-12-09

## 2019-12-12 PROBLEM — Z78.9 OTHER SPECIFIED HEALTH STATUS: Chronic | Status: ACTIVE | Noted: 2019-12-07

## 2019-12-17 ENCOUNTER — NON-APPOINTMENT (OUTPATIENT)
Age: 24
End: 2019-12-17

## 2019-12-17 ENCOUNTER — APPOINTMENT (OUTPATIENT)
Dept: OBGYN | Facility: CLINIC | Age: 24
End: 2019-12-17
Payer: MEDICAID

## 2019-12-17 VITALS
BODY MASS INDEX: 46.65 KG/M2 | WEIGHT: 280 LBS | HEIGHT: 65 IN | DIASTOLIC BLOOD PRESSURE: 76 MMHG | SYSTOLIC BLOOD PRESSURE: 134 MMHG

## 2019-12-17 DIAGNOSIS — Z34.03 ENCOUNTER FOR SUPERVISION OF NORMAL FIRST PREGNANCY, THIRD TRIMESTER: ICD-10-CM

## 2019-12-17 PROCEDURE — 0502F SUBSEQUENT PRENATAL CARE: CPT

## 2019-12-18 ENCOUNTER — OUTPATIENT (OUTPATIENT)
Dept: INPATIENT UNIT | Facility: HOSPITAL | Age: 24
LOS: 1 days | Discharge: HOME | End: 2019-12-18
Payer: MEDICAID

## 2019-12-18 VITALS — HEART RATE: 80 BPM | SYSTOLIC BLOOD PRESSURE: 129 MMHG | DIASTOLIC BLOOD PRESSURE: 61 MMHG

## 2019-12-18 VITALS
TEMPERATURE: 99 F | RESPIRATION RATE: 16 BRPM | SYSTOLIC BLOOD PRESSURE: 130 MMHG | DIASTOLIC BLOOD PRESSURE: 64 MMHG | HEART RATE: 83 BPM

## 2019-12-18 DIAGNOSIS — Z98.890 OTHER SPECIFIED POSTPROCEDURAL STATES: Chronic | ICD-10-CM

## 2019-12-18 DIAGNOSIS — Z3A.00 WEEKS OF GESTATION OF PREGNANCY NOT SPECIFIED: ICD-10-CM

## 2019-12-18 DIAGNOSIS — O26.899 OTHER SPECIFIED PREGNANCY RELATED CONDITIONS, UNSPECIFIED TRIMESTER: ICD-10-CM

## 2019-12-18 LAB
ALBUMIN SERPL ELPH-MCNC: 3.2 G/DL — LOW (ref 3.3–5)
ALP SERPL-CCNC: 113 U/L — SIGNIFICANT CHANGE UP (ref 40–120)
ALT FLD-CCNC: 10 U/L — SIGNIFICANT CHANGE UP (ref 4–33)
ANION GAP SERPL CALC-SCNC: 14 MMO/L — SIGNIFICANT CHANGE UP (ref 7–14)
APPEARANCE UR: SIGNIFICANT CHANGE UP
APTT BLD: 24.6 SEC — LOW (ref 27.5–36.3)
AST SERPL-CCNC: 18 U/L — SIGNIFICANT CHANGE UP (ref 4–32)
BACTERIA # UR AUTO: SIGNIFICANT CHANGE UP
BASOPHILS # BLD AUTO: 0.04 K/UL — SIGNIFICANT CHANGE UP (ref 0–0.2)
BASOPHILS NFR BLD AUTO: 0.4 % — SIGNIFICANT CHANGE UP (ref 0–2)
BILIRUB SERPL-MCNC: < 0.2 MG/DL — LOW (ref 0.2–1.2)
BILIRUB UR QL STRIP: NORMAL
BILIRUB UR-MCNC: NEGATIVE — SIGNIFICANT CHANGE UP
BLOOD UR QL VISUAL: HIGH
BUN SERPL-MCNC: 10 MG/DL — SIGNIFICANT CHANGE UP (ref 7–23)
CALCIUM SERPL-MCNC: 9.1 MG/DL — SIGNIFICANT CHANGE UP (ref 8.4–10.5)
CHLORIDE SERPL-SCNC: 105 MMOL/L — SIGNIFICANT CHANGE UP (ref 98–107)
CO2 SERPL-SCNC: 17 MMOL/L — LOW (ref 22–31)
COLOR SPEC: SIGNIFICANT CHANGE UP
CREAT ?TM UR-MCNC: 198.2 MG/DL — SIGNIFICANT CHANGE UP
CREAT SERPL-MCNC: 0.58 MG/DL — SIGNIFICANT CHANGE UP (ref 0.5–1.3)
EOSINOPHIL # BLD AUTO: 0.06 K/UL — SIGNIFICANT CHANGE UP (ref 0–0.5)
EOSINOPHIL NFR BLD AUTO: 0.6 % — SIGNIFICANT CHANGE UP (ref 0–6)
FIBRINOGEN PPP-MCNC: 649.8 MG/DL — HIGH (ref 350–510)
GLUCOSE SERPL-MCNC: 84 MG/DL — SIGNIFICANT CHANGE UP (ref 70–99)
GLUCOSE UR-MCNC: NEGATIVE — SIGNIFICANT CHANGE UP
GLUCOSE UR-MCNC: NORMAL
HCG UR QL: 0.2 EU/DL
HCT VFR BLD CALC: 34.9 % — SIGNIFICANT CHANGE UP (ref 34.5–45)
HGB BLD-MCNC: 11.3 G/DL — LOW (ref 11.5–15.5)
HGB UR QL STRIP.AUTO: NORMAL
HYALINE CASTS # UR AUTO: NEGATIVE — SIGNIFICANT CHANGE UP
IMM GRANULOCYTES NFR BLD AUTO: 1.2 % — SIGNIFICANT CHANGE UP (ref 0–1.5)
INR BLD: 0.95 — SIGNIFICANT CHANGE UP (ref 0.88–1.17)
KETONES UR-MCNC: NEGATIVE — SIGNIFICANT CHANGE UP
KETONES UR-MCNC: NORMAL
LDH SERPL L TO P-CCNC: 196 U/L — SIGNIFICANT CHANGE UP (ref 135–225)
LEUKOCYTE ESTERASE UR QL STRIP: NORMAL
LEUKOCYTE ESTERASE UR-ACNC: SIGNIFICANT CHANGE UP
LYMPHOCYTES # BLD AUTO: 1.46 K/UL — SIGNIFICANT CHANGE UP (ref 1–3.3)
LYMPHOCYTES # BLD AUTO: 14 % — SIGNIFICANT CHANGE UP (ref 13–44)
MCHC RBC-ENTMCNC: 27.8 PG — SIGNIFICANT CHANGE UP (ref 27–34)
MCHC RBC-ENTMCNC: 32.4 % — SIGNIFICANT CHANGE UP (ref 32–36)
MCV RBC AUTO: 86 FL — SIGNIFICANT CHANGE UP (ref 80–100)
MONOCYTES # BLD AUTO: 0.92 K/UL — HIGH (ref 0–0.9)
MONOCYTES NFR BLD AUTO: 8.8 % — SIGNIFICANT CHANGE UP (ref 2–14)
NEUTROPHILS # BLD AUTO: 7.8 K/UL — HIGH (ref 1.8–7.4)
NEUTROPHILS NFR BLD AUTO: 75 % — SIGNIFICANT CHANGE UP (ref 43–77)
NITRITE UR QL STRIP: NORMAL
NITRITE UR-MCNC: NEGATIVE — SIGNIFICANT CHANGE UP
NRBC # FLD: 0 K/UL — SIGNIFICANT CHANGE UP (ref 0–0)
PH UR STRIP: 7
PH UR: 6.5 — SIGNIFICANT CHANGE UP (ref 5–8)
PLATELET # BLD AUTO: 297 K/UL — SIGNIFICANT CHANGE UP (ref 150–400)
PMV BLD: 11 FL — SIGNIFICANT CHANGE UP (ref 7–13)
POTASSIUM SERPL-MCNC: 3.8 MMOL/L — SIGNIFICANT CHANGE UP (ref 3.5–5.3)
POTASSIUM SERPL-SCNC: 3.8 MMOL/L — SIGNIFICANT CHANGE UP (ref 3.5–5.3)
PROT SERPL-MCNC: 6.9 G/DL — SIGNIFICANT CHANGE UP (ref 6–8.3)
PROT UR STRIP-MCNC: NORMAL
PROT UR-MCNC: 10 — SIGNIFICANT CHANGE UP
PROT UR-MCNC: 49.6 MG/DL — SIGNIFICANT CHANGE UP
PROTHROM AB SERPL-ACNC: 10.5 SEC — SIGNIFICANT CHANGE UP (ref 9.8–13.1)
RBC # BLD: 4.06 M/UL — SIGNIFICANT CHANGE UP (ref 3.8–5.2)
RBC # FLD: 15.6 % — HIGH (ref 10.3–14.5)
RBC CASTS # UR COMP ASSIST: HIGH (ref 0–?)
SODIUM SERPL-SCNC: 136 MMOL/L — SIGNIFICANT CHANGE UP (ref 135–145)
SP GR SPEC: 1.02 — SIGNIFICANT CHANGE UP (ref 1–1.04)
SP GR UR STRIP: 1.02
SQUAMOUS # UR AUTO: SIGNIFICANT CHANGE UP
URATE SERPL-MCNC: 3.8 MG/DL — SIGNIFICANT CHANGE UP (ref 2.5–7)
UROBILINOGEN FLD QL: NORMAL — SIGNIFICANT CHANGE UP
WBC # BLD: 10.4 K/UL — SIGNIFICANT CHANGE UP (ref 3.8–10.5)
WBC # FLD AUTO: 10.4 K/UL — SIGNIFICANT CHANGE UP (ref 3.8–10.5)
WBC UR QL: HIGH (ref 0–?)

## 2019-12-18 PROCEDURE — 59025 FETAL NON-STRESS TEST: CPT | Mod: 26

## 2019-12-18 PROCEDURE — 99213 OFFICE O/P EST LOW 20 MIN: CPT | Mod: 25

## 2019-12-18 PROCEDURE — 76815 OB US LIMITED FETUS(S): CPT | Mod: 26

## 2019-12-18 RX ORDER — METOCLOPRAMIDE HCL 10 MG
10 TABLET ORAL ONCE
Refills: 0 | Status: COMPLETED | OUTPATIENT
Start: 2019-12-18 | End: 2019-12-18

## 2019-12-18 RX ORDER — SODIUM CHLORIDE 9 MG/ML
3 INJECTION INTRAMUSCULAR; INTRAVENOUS; SUBCUTANEOUS EVERY 8 HOURS
Refills: 0 | Status: DISCONTINUED | OUTPATIENT
Start: 2019-12-18 | End: 2020-01-09

## 2019-12-18 RX ADMIN — Medication 10 MILLIGRAM(S): at 11:23

## 2019-12-18 NOTE — OB PROVIDER TRIAGE NOTE - NSOBPROVIDERNOTE_OBGYN_ALL_OB_FT
25 y/o  @ 39.5 wks gest presents with c/o abdominal cramping/ painful uterine ctxns every 10 minutes since 0600 denies any LOF or VB reports +FM denies any n/v/d denies any fever or chills denies any visual disturbances or right upper epigastric pain pt c/o headache since  last took Tylenol  and reports 6/10  ap care comp by:  [facility] has been followed for HTN since  and [facility] has had a headache since  otherwise no other ap care comp at this time      abdomen: soft, nt on palp  SVE: 0/50/-3  T(C): 37.2 (19 @ 08:53), Max: 37.2 (19 @ 08:41)  HR: 83 (19 @ 08:56) (83 - 83)  BP: 130/64 (19 @ 08:56) (130/64 - 130/64)  RR: 16 (19 @ 08:41) (16 - 16)  SpO2: --  NST in progress  TAS; vtx     NKA  med hx:   migraines  surg hx:   left knee sx  gyn hx: denies  ob hx: denies  meds: PNV   psychosocial hx:   hx anxiety was in counseling in the past no meds and was in counseling every other week ~ 3-4 mos 25 y/o  @ 39.5 wks gest presents with c/o abdominal cramping/ painful uterine ctxns every 10 minutes since 0600 denies any LOF or VB reports +FM denies any n/v/d denies any fever or chills denies any visual disturbances or right upper epigastric pain pt c/o headache since  last took Tylenol  and reports 6/10  ap care comp by:  [facility] has been followed for HTN since  and [facility] has had a headache since  otherwise no other ap care comp at this time      abdomen: soft, nt on palp  SVE: 0/50/-3  T(C): 37.2 (19 @ 08:53), Max: 37.2 (19 @ 08:41)  HR: 83 (19 @ 08:56) (83 - 83)  BP: 130/64 (19 @ 08:56) (130/64 - 130/64)  RR: 16 (19 @ 08:41) (16 - 16)  SpO2: --  NST in progress  TAS; vtx     NKA  med hx:   migraines  surg hx:   left knee sx  gyn hx: denies  ob hx: denies  meds: PNV   psychosocial hx:   hx anxiety was in counseling in the past no meds and was in counseling every other week ~ 3-4 mos      cat 1 FHT  toco: 3- 4 minutes 25 y/o  @ 39.5 wks gest presents with c/o abdominal cramping/ painful uterine ctxns every 10 minutes since 0600 denies any LOF or VB reports +FM denies any n/v/d denies any fever or chills denies any visual disturbances or right upper epigastric pain pt c/o headache since  last took Tylenol  and reports 6/10  ap care comp by:  [facility] has been followed for HTN since  and [facility] has had a headache since  otherwise no other ap care comp at this time      abdomen: soft, nt on palp  SVE: 0/50/-3  T(C): 37.2 (19 @ 08:53), Max: 37.2 (19 @ 08:41)  HR: 83 (19 @ 08:56) (83 - 83)  BP: 130/64 (19 @ 08:56) (130/64 - 130/64)  RR: 16 (19 @ 08:41) (16 - 16)  SpO2: --  NST in progress  TAS; vtx     NKA  med hx:   migraines  surg hx:   left knee sx  gyn hx: denies  ob hx: denies  meds: PNV   psychosocial hx:   hx anxiety was in counseling in the past no meds and was in counseling every other week ~ 3-4 mos      cat 1 FHT  toco: 3- 4 minutes    addendum @ 1110:  EFM tracing reviewed with Dr Gann discussed plan of care   pt still with c/o headache   Reglan 10 mg IVP x's 1  will continue to monitor 25 y/o  @ 39.5 wks gest presents with c/o abdominal cramping/ painful uterine ctxns every 10 minutes since 0600 denies any LOF or VB reports +FM denies any n/v/d denies any fever or chills denies any visual disturbances or right upper epigastric pain pt c/o headache since  last took Tylenol  and reports 6/10  ap care comp by:  [facility] has been followed for HTN since  and [facility] has had a headache since  otherwise no other ap care comp at this time      abdomen: soft, nt on palp  SVE: 0.5/50/-3  T(C): 37.2 (19 @ 08:53), Max: 37.2 (19 @ 08:41)  HR: 83 (19 @ 08:56) (83 - 83)  BP: 130/64 (19 @ 08:56) (130/64 - 130/64)  RR: 16 (19 @ 08:41) (16 - 16)  SpO2: --  NST in progress  TAS; vtx     NKA  med hx:   migraines  surg hx:   left knee sx  gyn hx: denies  ob hx: denies  meds: PNV   psychosocial hx:   hx anxiety was in counseling in the past no meds and was in counseling every other week ~ 3-4 mos      cat 1 FHT  toco: 3- 4 minutes    addendum @ 1110:  EFM tracing reviewed with Dr Gann discussed plan of care   pt still with c/o headache   Reglan 10 mg IVP x's 1  will continue to monitor        addendum @ 1230:  pt reports feeling better after receiving reglan , denies any headache at this time  reports having uterine ctxns every 5-7 minutes  SVE: 0.5/50/-3  TAS: BPP: 88 vtx CRESENCIO: 9.02 anterior placenta   T(C): 37.2 (19 @ 08:53), Max: 37.2 (19 @ 08:41)  HR: 80 (19 @ 11:42) (77 - 92)  BP: 129/61 (-19 @ 11:42) (121/58 - 147/63) semi fowlers position   RR: 16 (12-18-19 @ 08:41) (16 - 16)  SpO2: --    no evidence of PEC   no evidence of active labor   maternal and fetal status reassuring  d/w dr gann  discharge home  labor precautions d/w pt  PEC s&sxs d/w pt  increase fluid intake  instructed on fetal kickcounts  follow up with dr carlisle as sched  w/v discharge instructions given  discharged home

## 2019-12-18 NOTE — OB PROVIDER TRIAGE NOTE - ADDITIONAL INSTRUCTIONS
no evidence of PEC   no evidence of active labor   maternal and fetal status reassuring  d/w dr abrams  discharge home  labor precautions d/w pt  PEC s&sxs d/w pt  increase fluid intake  instructed on fetal kickcounts  follow up with dr carlisle as sched  w/v discharge instructions given  discharged home

## 2019-12-18 NOTE — OB PROVIDER TRIAGE NOTE - NSHPLABSRESULTS_GEN_ALL_CORE
PEC labs   saline lock PEC labs   saline lock    CBC Full  -  ( 18 Dec 2019 09:10 )  WBC Count : 10.40 K/uL  RBC Count : 4.06 M/uL  Hemoglobin : 11.3 g/dL  Hematocrit : 34.9 %  Platelet Count - Automated : 297 K/uL  Mean Cell Volume : 86.0 fL  Mean Cell Hemoglobin : 27.8 pg  Mean Cell Hemoglobin Concentration : 32.4 %  Auto Neutrophil # : 7.80 K/uL  Auto Lymphocyte # : 1.46 K/uL  Auto Monocyte # : 0.92 K/uL  Auto Eosinophil # : 0.06 K/uL  Auto Basophil # : 0.04 K/uL  Auto Neutrophil % : 75.0 %  Auto Lymphocyte % : 14.0 %  Auto Monocyte % : 8.8 %  Auto Eosinophil % : 0.6 %  Auto Basophil % : 0.4 %    12-18    136  |  105  |  10  ----------------------------<  84  3.8   |  17<L>  |  0.58    Ca    9.1      18 Dec 2019 09:10    TPro  6.9  /  Alb  3.2<L>  /  TBili  < 0.2<L>  /  DBili  x   /  AST  18  /  ALT  10  /  AlkPhos  113  12-18    PT/INR - ( 18 Dec 2019 09:10 )   PT: 10.5 SEC;   INR: 0.95          PTT - ( 18 Dec 2019 09:10 )  PTT:24.6 SEC PEC labs   saline lock    CBC Full  -  ( 18 Dec 2019 09:10 )  WBC Count : 10.40 K/uL  RBC Count : 4.06 M/uL  Hemoglobin : 11.3 g/dL  Hematocrit : 34.9 %  Platelet Count - Automated : 297 K/uL  Mean Cell Volume : 86.0 fL  Mean Cell Hemoglobin : 27.8 pg  Mean Cell Hemoglobin Concentration : 32.4 %  Auto Neutrophil # : 7.80 K/uL  Auto Lymphocyte # : 1.46 K/uL  Auto Monocyte # : 0.92 K/uL  Auto Eosinophil # : 0.06 K/uL  Auto Basophil # : 0.04 K/uL  Auto Neutrophil % : 75.0 %  Auto Lymphocyte % : 14.0 %  Auto Monocyte % : 8.8 %  Auto Eosinophil % : 0.6 %  Auto Basophil % : 0.4 %    12-18    136  |  105  |  10  ----------------------------<  84  3.8   |  17<L>  |  0.58    Ca    9.1      18 Dec 2019 09:10    TPro  6.9  /  Alb  3.2<L>  /  TBili  < 0.2<L>  /  DBili  x   /  AST  18  /  ALT  10  /  AlkPhos  113  12-18    PT/INR - ( 18 Dec 2019 09:10 )   PT: 10.5 SEC;   INR: 0.95          PTT - ( 18 Dec 2019 09:10 )  PTT:24.6 SEC      creatinine, random urine: 198.20  protein, random urine: 49.6    PCR: 0.25

## 2019-12-22 ENCOUNTER — TRANSCRIPTION ENCOUNTER (OUTPATIENT)
Age: 24
End: 2019-12-22

## 2019-12-23 ENCOUNTER — EMERGENCY (EMERGENCY)
Facility: HOSPITAL | Age: 24
LOS: 1 days | Discharge: STILL ADMITTED OR ADMINISTRATIVE | End: 2019-12-23
Admitting: EMERGENCY MEDICINE

## 2019-12-23 ENCOUNTER — INPATIENT (INPATIENT)
Facility: HOSPITAL | Age: 24
LOS: 2 days | Discharge: HOME | End: 2019-12-26
Attending: OBSTETRICS & GYNECOLOGY | Admitting: OBSTETRICS & GYNECOLOGY
Payer: MEDICAID

## 2019-12-23 ENCOUNTER — TRANSCRIPTION ENCOUNTER (OUTPATIENT)
Age: 24
End: 2019-12-23

## 2019-12-23 VITALS
HEART RATE: 92 BPM | RESPIRATION RATE: 18 BRPM | SYSTOLIC BLOOD PRESSURE: 136 MMHG | OXYGEN SATURATION: 95 % | DIASTOLIC BLOOD PRESSURE: 71 MMHG | TEMPERATURE: 98 F

## 2019-12-23 VITALS
SYSTOLIC BLOOD PRESSURE: 128 MMHG | RESPIRATION RATE: 20 BRPM | TEMPERATURE: 98 F | DIASTOLIC BLOOD PRESSURE: 78 MMHG | HEART RATE: 86 BPM

## 2019-12-23 DIAGNOSIS — Z3A.00 WEEKS OF GESTATION OF PREGNANCY NOT SPECIFIED: ICD-10-CM

## 2019-12-23 DIAGNOSIS — O26.899 OTHER SPECIFIED PREGNANCY RELATED CONDITIONS, UNSPECIFIED TRIMESTER: ICD-10-CM

## 2019-12-23 DIAGNOSIS — Z98.890 OTHER SPECIFIED POSTPROCEDURAL STATES: Chronic | ICD-10-CM

## 2019-12-23 LAB
BASOPHILS # BLD AUTO: 0.04 K/UL — SIGNIFICANT CHANGE UP (ref 0–0.2)
BASOPHILS NFR BLD AUTO: 0.3 % — SIGNIFICANT CHANGE UP (ref 0–2)
BLD GP AB SCN SERPL QL: NEGATIVE — SIGNIFICANT CHANGE UP
EOSINOPHIL # BLD AUTO: 0.05 K/UL — SIGNIFICANT CHANGE UP (ref 0–0.5)
EOSINOPHIL NFR BLD AUTO: 0.4 % — SIGNIFICANT CHANGE UP (ref 0–6)
HCT VFR BLD CALC: 36.2 % — SIGNIFICANT CHANGE UP (ref 34.5–45)
HGB BLD-MCNC: 11.7 G/DL — SIGNIFICANT CHANGE UP (ref 11.5–15.5)
IMM GRANULOCYTES NFR BLD AUTO: 0.5 % — SIGNIFICANT CHANGE UP (ref 0–1.5)
LYMPHOCYTES # BLD AUTO: 1.07 K/UL — SIGNIFICANT CHANGE UP (ref 1–3.3)
LYMPHOCYTES # BLD AUTO: 9.2 % — LOW (ref 13–44)
MCHC RBC-ENTMCNC: 27.3 PG — SIGNIFICANT CHANGE UP (ref 27–34)
MCHC RBC-ENTMCNC: 32.3 % — SIGNIFICANT CHANGE UP (ref 32–36)
MCV RBC AUTO: 84.4 FL — SIGNIFICANT CHANGE UP (ref 80–100)
MONOCYTES # BLD AUTO: 1.1 K/UL — HIGH (ref 0–0.9)
MONOCYTES NFR BLD AUTO: 9.4 % — SIGNIFICANT CHANGE UP (ref 2–14)
NEUTROPHILS # BLD AUTO: 9.35 K/UL — HIGH (ref 1.8–7.4)
NEUTROPHILS NFR BLD AUTO: 80.2 % — HIGH (ref 43–77)
NRBC # FLD: 0 K/UL — SIGNIFICANT CHANGE UP (ref 0–0)
PLATELET # BLD AUTO: 318 K/UL — SIGNIFICANT CHANGE UP (ref 150–400)
PMV BLD: 11.2 FL — SIGNIFICANT CHANGE UP (ref 7–13)
RBC # BLD: 4.29 M/UL — SIGNIFICANT CHANGE UP (ref 3.8–5.2)
RBC # FLD: 15.7 % — HIGH (ref 10.3–14.5)
RH IG SCN BLD-IMP: POSITIVE — SIGNIFICANT CHANGE UP
WBC # BLD: 11.67 K/UL — HIGH (ref 3.8–10.5)
WBC # FLD AUTO: 11.67 K/UL — HIGH (ref 3.8–10.5)

## 2019-12-23 PROCEDURE — 59510 CESAREAN DELIVERY: CPT | Mod: U9

## 2019-12-23 RX ORDER — MAGNESIUM HYDROXIDE 400 MG/1
30 TABLET, CHEWABLE ORAL
Refills: 0 | Status: DISCONTINUED | OUTPATIENT
Start: 2019-12-23 | End: 2019-12-26

## 2019-12-23 RX ORDER — OXYCODONE HYDROCHLORIDE 5 MG/1
5 TABLET ORAL ONCE
Refills: 0 | Status: DISCONTINUED | OUTPATIENT
Start: 2019-12-23 | End: 2019-12-26

## 2019-12-23 RX ORDER — OXYTOCIN 10 UNIT/ML
41.67 VIAL (ML) INJECTION
Qty: 20 | Refills: 0 | Status: DISCONTINUED | OUTPATIENT
Start: 2019-12-23 | End: 2019-12-25

## 2019-12-23 RX ORDER — SIMETHICONE 80 MG/1
80 TABLET, CHEWABLE ORAL EVERY 4 HOURS
Refills: 0 | Status: DISCONTINUED | OUTPATIENT
Start: 2019-12-23 | End: 2019-12-26

## 2019-12-23 RX ORDER — IBUPROFEN 200 MG
600 TABLET ORAL EVERY 6 HOURS
Refills: 0 | Status: COMPLETED | OUTPATIENT
Start: 2019-12-23 | End: 2020-11-20

## 2019-12-23 RX ORDER — TETANUS TOXOID, REDUCED DIPHTHERIA TOXOID AND ACELLULAR PERTUSSIS VACCINE, ADSORBED 5; 2.5; 8; 8; 2.5 [IU]/.5ML; [IU]/.5ML; UG/.5ML; UG/.5ML; UG/.5ML
0.5 SUSPENSION INTRAMUSCULAR ONCE
Refills: 0 | Status: DISCONTINUED | OUTPATIENT
Start: 2019-12-23 | End: 2019-12-26

## 2019-12-23 RX ORDER — GLYCERIN ADULT
1 SUPPOSITORY, RECTAL RECTAL AT BEDTIME
Refills: 0 | Status: DISCONTINUED | OUTPATIENT
Start: 2019-12-23 | End: 2019-12-26

## 2019-12-23 RX ORDER — OXYTOCIN 10 UNIT/ML
333.33 VIAL (ML) INJECTION
Qty: 20 | Refills: 0 | Status: DISCONTINUED | OUTPATIENT
Start: 2019-12-23 | End: 2019-12-24

## 2019-12-23 RX ORDER — ACETAMINOPHEN 500 MG
975 TABLET ORAL
Refills: 0 | Status: DISCONTINUED | OUTPATIENT
Start: 2019-12-23 | End: 2019-12-26

## 2019-12-23 RX ORDER — SODIUM CHLORIDE 9 MG/ML
1000 INJECTION, SOLUTION INTRAVENOUS
Refills: 0 | Status: DISCONTINUED | OUTPATIENT
Start: 2019-12-23 | End: 2019-12-23

## 2019-12-23 RX ORDER — CEFAZOLIN SODIUM 1 G
2000 VIAL (EA) INJECTION EVERY 8 HOURS
Refills: 0 | Status: COMPLETED | OUTPATIENT
Start: 2019-12-23 | End: 2019-12-24

## 2019-12-23 RX ORDER — DIPHENHYDRAMINE HCL 50 MG
25 CAPSULE ORAL EVERY 6 HOURS
Refills: 0 | Status: DISCONTINUED | OUTPATIENT
Start: 2019-12-23 | End: 2019-12-26

## 2019-12-23 RX ORDER — KETOROLAC TROMETHAMINE 30 MG/ML
30 SYRINGE (ML) INJECTION EVERY 6 HOURS
Refills: 0 | Status: DISCONTINUED | OUTPATIENT
Start: 2019-12-23 | End: 2019-12-24

## 2019-12-23 RX ORDER — HEPARIN SODIUM 5000 [USP'U]/ML
5000 INJECTION INTRAVENOUS; SUBCUTANEOUS EVERY 12 HOURS
Refills: 0 | Status: DISCONTINUED | OUTPATIENT
Start: 2019-12-23 | End: 2019-12-24

## 2019-12-23 RX ORDER — SODIUM CHLORIDE 9 MG/ML
1000 INJECTION, SOLUTION INTRAVENOUS
Refills: 0 | Status: DISCONTINUED | OUTPATIENT
Start: 2019-12-23 | End: 2019-12-25

## 2019-12-23 RX ORDER — LANOLIN
1 OINTMENT (GRAM) TOPICAL EVERY 6 HOURS
Refills: 0 | Status: DISCONTINUED | OUTPATIENT
Start: 2019-12-23 | End: 2019-12-26

## 2019-12-23 RX ORDER — OXYCODONE HYDROCHLORIDE 5 MG/1
5 TABLET ORAL
Refills: 0 | Status: COMPLETED | OUTPATIENT
Start: 2019-12-23 | End: 2019-12-30

## 2019-12-23 RX ORDER — INFLUENZA VIRUS VACCINE 15; 15; 15; 15 UG/.5ML; UG/.5ML; UG/.5ML; UG/.5ML
0.5 SUSPENSION INTRAMUSCULAR ONCE
Refills: 0 | Status: DISCONTINUED | OUTPATIENT
Start: 2019-12-23 | End: 2019-12-26

## 2019-12-23 RX ADMIN — SODIUM CHLORIDE 125 MILLILITER(S): 9 INJECTION, SOLUTION INTRAVENOUS at 19:06

## 2019-12-23 RX ADMIN — Medication 100 MILLIGRAM(S): at 23:08

## 2019-12-23 NOTE — OB NEONATOLOGY/PEDIATRICIAN DELIVERY SUMMARY - NSPEDSNEONOTESA_OBGYN_ALL_OB_FT
Baby is a 40 and 3 wk GA female born to a 21 y/o  mother via primary crash C/S for NRFHT and meconium. Maternal history of anxiety, no meds. Prenatal history uncomplicated. Maternal blood type B+. Prenatal labs negative, non-reactive, and immune. GBS negative on 19. sROM at 6am at  on clear and then meconium . Baby born with decreased tone and weak cry, heavy meconium, was warmed, dried, suctioned and stimulated- by 5mol of improved tone and color with good cry, saturations above 90 Apgars 7/9 for tone, cry and color. EOS 0.03. Mom plans to breast and formula feed, would like hepB and circ. Baby is a 40 and 3 wk GA male born to a 21 y/o  mother via primary crash C/S for NRFHT and meconium. Maternal history of anxiety, no meds. Prenatal history uncomplicated. Maternal blood type B+. Prenatal labs negative, non-reactive, and immune. GBS negative on 19. sROM at 6am at  on clear and then meconium . Baby born with decreased tone and weak cry, heavy meconium, was warmed, dried, suctioned and stimulated- by 5mol of improved tone and color with good cry, saturations above 90 Apgars 7/9 for tone, cry and color. EOS 0.03. Mom plans to breast and formula feed, would like hepB and circ.

## 2019-12-23 NOTE — OB PROVIDER H&P - ASSESSMENT
's patient is 23 y/o black female who is EGA 40 3  reporting of leaking of fluid since 0900 and contractions every 3 minutes. Patient reports of fetal movement. GBS status:  negative    AP complications:   - patient reports of blood pressures elevated twice in pregnancy, denies symptoms associated with PEC   Medical History: Denies  Surgical History: Knee surgery at 9 y/o  OBGYN History: Denies    Vital Signs Last 24 Hrs  T(C): 36.9 (23 Dec 2019 11:05), Max: 36.9 (23 Dec 2019 11:05)  T(F): 98.4 (23 Dec 2019 11:05), Max: 98.4 (23 Dec 2019 11:05)  HR: 86 (23 Dec 2019 11:28) (86 - 92)  BP: 128/78 (23 Dec 2019 11:28) (128/78 - 136/71)  RR: 20 (23 Dec 2019 11:05) (18 - 20)  SpO2: 95% (23 Dec 2019 10:49) (95% - 95%)  TAS: cephalic presentation  SVE: 4//-2, clear fluid, nitrazine positive, fern positive  EFW 3402    Evidence of rupture of membrane.   - admit to labor and delivery, d/w   - for epidural placement

## 2019-12-23 NOTE — OB PROVIDER TRIAGE NOTE - HISTORY OF PRESENT ILLNESS
's patient is 25 y/o black female who is EGA 40 3  reporting of leaking of fluid since 0900 and contractions every 3 minutes. Patient reports of fetal movement.     AP complications:   - patient reports of blood pressures elevated twice in pregnancy, denies symptoms associated with PEC   Medical History: Denies  Surgical History: Knee surgery at 11 y/o  OBGYN History: Denies

## 2019-12-23 NOTE — CHART NOTE - NSCHARTNOTEFT_GEN_A_CORE
R1 Note 12-23-19 @ 14:19    Pt examined for late deceleration       VITALS:  T(C): 36.9 (12-23-19 @ 11:45), Max: 36.9 (12-23-19 @ 11:05)  HR: 82 (12-23-19 @ 14:04) (73 - 103)  BP: 126/61 (12-23-19 @ 14:02) (93/44 - 136/71)  RR: 20 (12-23-19 @ 11:05) (18 - 20)  SpO2: 100% (12-23-19 @ 14:04) (95% - 100%)    EFM:  mod natasha +accels -decels  [place]: Ctx Q4-5min  VE: unchanged from earlier 6/80/-2      IMPRESSION:   FHR Category: 2  Additional:     PLAN:  Cytotec:  [ ] Continue PO Cyto     [ ] Continue VCyto   [ ] Stop Cytotec  Pitocin: [ ] Start Pitocin   [ ] Increase Pitocin  [  ] Continue Pitocin  [ ] Decrease Pitocin   [ ] Discontinue Pitocin  [x]Expectant management [ ]Peanut ball [ ]Labor down  [ ]Re-evaluate  Additional: continue maternal resusication efforts, positional changes, O2 in place, IV bolus PRN.     Georges PGY1  tbdw attending R1 Note 12-23-19 @ 14:19    Pt examined for late decelerations, and loss of contact      VITALS:  T(C): 36.9 (12-23-19 @ 11:45), Max: 36.9 (12-23-19 @ 11:05)  HR: 82 (12-23-19 @ 14:04) (73 - 103)  BP: 126/61 (12-23-19 @ 14:02) (93/44 - 136/71)  RR: 20 (12-23-19 @ 11:05) (18 - 20)  SpO2: 100% (12-23-19 @ 14:04) (95% - 100%)    EFM:  mod natasha +accels -decels  [place]: Ctx Q4-5min  VE: unchanged from earlier 6/80/-2    ISC placed without difficulty       IMPRESSION:   FHR Category: 2  Additional:     PLAN:  Cytotec:  [ ] Continue PO Cyto     [ ] Continue VCyto   [ ] Stop Cytotec  Pitocin: [ ] Start Pitocin   [ ] Increase Pitocin  [  ] Continue Pitocin  [ ] Decrease Pitocin   [ ] Discontinue Pitocin  [x]Expectant management [ ]Peanut ball [ ]Labor down  [ ]Re-evaluate  Additional: continue maternal resusication efforts, positional changes, O2 in place, IV bolus PRN.   ISC in place    Georges PGY1  d/w Dr. Domingo

## 2019-12-23 NOTE — OB RN DELIVERY SUMMARY - NS_SEPSISRSKCALC_OBGYN_ALL_OB_FT
EOS calculated successfully. EOS Risk Factor: 0.5/1000 live births ([facility] national incidence); GA=40w3d; Temp=98.42; ROM=14.85; GBS='Negative'; Antibiotics='No antibiotics or any antibiotics < 2 hrs prior to birth'

## 2019-12-23 NOTE — OB PROVIDER H&P - HISTORY OF PRESENT ILLNESS
's patient is 23 y/o black female who is EGA 40 3  reporting of leaking of fluid since 0900 and contractions every 3 minutes. Patient reports of fetal movement.     AP complications:   - patient reports of blood pressures elevated twice in pregnancy, denies symptoms associated with PEC   Medical History: Denies  Surgical History: Knee surgery at 11 y/o  OBGYN History: Denies

## 2019-12-23 NOTE — OB PROVIDER TRIAGE NOTE - NSHPPHYSICALEXAM_GEN_ALL_CORE
Vital Signs Last 24 Hrs  T(C): 36.9 (23 Dec 2019 11:05), Max: 36.9 (23 Dec 2019 11:05)  T(F): 98.4 (23 Dec 2019 11:05), Max: 98.4 (23 Dec 2019 11:05)  HR: 86 (23 Dec 2019 11:28) (86 - 92)  BP: 128/78 (23 Dec 2019 11:28) (128/78 - 136/71)  RR: 20 (23 Dec 2019 11:05) (18 - 20)  SpO2: 95% (23 Dec 2019 10:49) (95% - 95%)  TAS: cephalic presentation  SVE: 4/70/-2, clear fluid, nitrazine positive, fern positive  EFW 3402

## 2019-12-23 NOTE — OB RN DELIVERY SUMMARY - AS DELIV COMPLICATIONS OB
abnormal fetal heart rate tracing/meconium stained fluid abnormal fetal heart rate tracing/prolonged rupture of membranes/meconium stained fluid

## 2019-12-23 NOTE — DISCHARGE NOTE OB - MATERIALS PROVIDED
[facility] Hearing Screen Program/Birth Certificate Instructions/Vaccinations/Shaken Baby Prevention Handout/[place]  Immunization Record/Guide to Postpartum Care

## 2019-12-23 NOTE — OB PROVIDER TRIAGE NOTE - NSOBPROVIDERNOTE_OBGYN_ALL_OB_FT
's patient is 25 y/o black female who is EGA 40 3/  reporting of leaking of fluid since 0900 and contractions every 3 minutes. Patient reports of fetal movement.     AP complications:   - patient reports of blood pressures elevated twice in pregnancy, denies symptoms associated with PEC   Medical History: Denies  Surgical History: Knee surgery at 11 y/o  OBGYN History: Denies    Vital Signs Last 24 Hrs  T(C): 36.9 (23 Dec 2019 11:05), Max: 36.9 (23 Dec 2019 11:05)  T(F): 98.4 (23 Dec 2019 11:05), Max: 98.4 (23 Dec 2019 11:05)  HR: 86 (23 Dec 2019 11:28) (86 - 92)  BP: 128/78 (23 Dec 2019 11:28) (128/78 - 136/71)  RR: 20 (23 Dec 2019 11:05) (18 - 20)  SpO2: 95% (23 Dec 2019 10:49) (95% - 95%)  TAS: cephalic presentation  SVE: /-2, clear fluid, nitrazine positive, fern positive  EFW 3402    Evidence of rupture of membrane.   - admit to labor and delivery, d/w   - for epidural placement 's patient is 25 y/o black female who is EGA 40 3/  reporting of leaking of fluid since 0900 and contractions every 3 minutes. Patient reports of fetal movement. GBS status: negative 2019    AP complications:   - patient reports of blood pressures elevated twice in pregnancy, denies symptoms associated with PEC   Medical History: Denies  Surgical History: Knee surgery at 11 y/o  OBGYN History: Denies    Vital Signs Last 24 Hrs  T(C): 36.9 (23 Dec 2019 11:05), Max: 36.9 (23 Dec 2019 11:05)  T(F): 98.4 (23 Dec 2019 11:05), Max: 98.4 (23 Dec 2019 11:05)  HR: 86 (23 Dec 2019 11:28) (86 - 92)  BP: 128/78 (23 Dec 2019 11:28) (128/78 - 136/71)  RR: 20 (23 Dec 2019 11:05) (18 - 20)  SpO2: 95% (23 Dec 2019 10:49) (95% - 95%)  TAS: cephalic presentation  SVE: 4/70/-2, clear fluid, nitrazine positive, fern positive  EFW 3402    Evidence of rupture of membrane.   - admit to labor and delivery, d/w   - for epidural placement

## 2019-12-23 NOTE — CHART NOTE - NSCHARTNOTEFT_GEN_A_CORE
R1 OB Tracing Note    T(C): 36.9 (12-23-19 @ 11:45), Max: 36.9 (12-23-19 @ 11:05)  HR: 70 (12-23-19 @ 15:49) (70 - 103)  BP: 124/58 (12-23-19 @ 15:34) (93/44 - 147/94)  RR: 20 (12-23-19 @ 11:05) (18 - 20)  SpO2: 100% (12-23-19 @ 15:46) (95% - 100%)    EFM: 130  bpm, mod natasha, +accels, -decels  [place]: cx q3-4    A/P 24y P0 admitted for labor   -Cat 1 tracing with ISC in place  - Cont expt mgmt     RGerber PGY1

## 2019-12-23 NOTE — CHART NOTE - NSCHARTNOTEFT_GEN_A_CORE
Associate Chief of L & D( Late Entry)    Called to triage 4 for a deceleration post epidural placement.  Patient was repositioned  to maternal left, O2 was given and increased hydration.   Anesthesia was called can ephedrine was given and the fetal heart rate recovered.    Seema Palmer M.D.

## 2019-12-23 NOTE — DISCHARGE NOTE OB - CARE PLAN
Principal Discharge DX:	 delivery delivered  Goal:	fully recovered  Assessment and plan of treatment:	dr carlisle 2 weeks wound check

## 2019-12-23 NOTE — DISCHARGE NOTE OB - CARE PROVIDER_API CALL
Matthew Harmon)  [facility] Other  [address] [place] [address]  Phone: [phone]  Fax: [phone]  Follow Up Time:

## 2019-12-23 NOTE — DISCHARGE NOTE OB - PATIENT PORTAL LINK FT
You can access the FollowMyHealth Patient Portal offered by [facility] by registering at the following website: [email or web address]. By joining [facility]’s FollowMyHealth portal, you will also be able to view your health information using other applications (apps) compatible with our system.

## 2019-12-24 ENCOUNTER — APPOINTMENT (OUTPATIENT)
Dept: OBGYN | Facility: CLINIC | Age: 24
End: 2019-12-24

## 2019-12-24 LAB
BASOPHILS # BLD AUTO: 0.02 K/UL — SIGNIFICANT CHANGE UP (ref 0–0.2)
BASOPHILS NFR BLD AUTO: 0.1 % — SIGNIFICANT CHANGE UP (ref 0–2)
EOSINOPHIL # BLD AUTO: 0 K/UL — SIGNIFICANT CHANGE UP (ref 0–0.5)
EOSINOPHIL NFR BLD AUTO: 0 % — SIGNIFICANT CHANGE UP (ref 0–6)
HCT VFR BLD CALC: 32.8 % — LOW (ref 34.5–45)
HGB BLD-MCNC: 10.3 G/DL — LOW (ref 11.5–15.5)
IMM GRANULOCYTES NFR BLD AUTO: 0.7 % — SIGNIFICANT CHANGE UP (ref 0–1.5)
LYMPHOCYTES # BLD AUTO: 0.87 K/UL — LOW (ref 1–3.3)
LYMPHOCYTES # BLD AUTO: 4.6 % — LOW (ref 13–44)
MCHC RBC-ENTMCNC: 26.9 PG — LOW (ref 27–34)
MCHC RBC-ENTMCNC: 31.4 % — LOW (ref 32–36)
MCV RBC AUTO: 85.6 FL — SIGNIFICANT CHANGE UP (ref 80–100)
MONOCYTES # BLD AUTO: 1.12 K/UL — HIGH (ref 0–0.9)
MONOCYTES NFR BLD AUTO: 6 % — SIGNIFICANT CHANGE UP (ref 2–14)
NEUTROPHILS # BLD AUTO: 16.56 K/UL — HIGH (ref 1.8–7.4)
NEUTROPHILS NFR BLD AUTO: 88.6 % — HIGH (ref 43–77)
NRBC # FLD: 0 K/UL — SIGNIFICANT CHANGE UP (ref 0–0)
PLATELET # BLD AUTO: 297 K/UL — SIGNIFICANT CHANGE UP (ref 150–400)
PMV BLD: 11.3 FL — SIGNIFICANT CHANGE UP (ref 7–13)
RBC # BLD: 3.83 M/UL — SIGNIFICANT CHANGE UP (ref 3.8–5.2)
RBC # FLD: 15.9 % — HIGH (ref 10.3–14.5)
T PALLIDUM AB TITR SER: NEGATIVE — SIGNIFICANT CHANGE UP
WBC # BLD: 18.71 K/UL — HIGH (ref 3.8–10.5)
WBC # FLD AUTO: 18.71 K/UL — HIGH (ref 3.8–10.5)

## 2019-12-24 RX ORDER — HEPARIN SODIUM 5000 [USP'U]/ML
10000 INJECTION INTRAVENOUS; SUBCUTANEOUS EVERY 12 HOURS
Refills: 0 | Status: DISCONTINUED | OUTPATIENT
Start: 2019-12-24 | End: 2019-12-26

## 2019-12-24 RX ORDER — IBUPROFEN 200 MG
600 TABLET ORAL EVERY 6 HOURS
Refills: 0 | Status: DISCONTINUED | OUTPATIENT
Start: 2019-12-24 | End: 2019-12-26

## 2019-12-24 RX ADMIN — Medication 125 MILLIUNIT(S)/MIN: at 00:07

## 2019-12-24 RX ADMIN — Medication 975 MILLIGRAM(S): at 02:54

## 2019-12-24 RX ADMIN — SIMETHICONE 80 MILLIGRAM(S): 80 TABLET, CHEWABLE ORAL at 08:17

## 2019-12-24 RX ADMIN — Medication 30 MILLIGRAM(S): at 13:59

## 2019-12-24 RX ADMIN — Medication 100 MILLIGRAM(S): at 13:58

## 2019-12-24 RX ADMIN — Medication 600 MILLIGRAM(S): at 23:16

## 2019-12-24 RX ADMIN — Medication 30 MILLIGRAM(S): at 09:10

## 2019-12-24 RX ADMIN — Medication 975 MILLIGRAM(S): at 12:55

## 2019-12-24 RX ADMIN — Medication 30 MILLIGRAM(S): at 02:54

## 2019-12-24 RX ADMIN — Medication 30 MILLIGRAM(S): at 03:24

## 2019-12-24 RX ADMIN — Medication 975 MILLIGRAM(S): at 03:24

## 2019-12-24 RX ADMIN — Medication 975 MILLIGRAM(S): at 18:32

## 2019-12-24 RX ADMIN — HEPARIN SODIUM 10000 UNIT(S): 5000 INJECTION INTRAVENOUS; SUBCUTANEOUS at 02:55

## 2019-12-24 RX ADMIN — Medication 975 MILLIGRAM(S): at 18:30

## 2019-12-24 RX ADMIN — Medication 30 MILLIGRAM(S): at 08:18

## 2019-12-24 RX ADMIN — Medication 100 MILLIGRAM(S): at 06:32

## 2019-12-24 RX ADMIN — Medication 30 MILLIGRAM(S): at 14:20

## 2019-12-24 RX ADMIN — HEPARIN SODIUM 10000 UNIT(S): 5000 INJECTION INTRAVENOUS; SUBCUTANEOUS at 13:58

## 2019-12-24 RX ADMIN — Medication 975 MILLIGRAM(S): at 12:00

## 2019-12-24 NOTE — PROGRESS NOTE ADULT - SUBJECTIVE AND OBJECTIVE BOX
OB Attending Postpartum Note    Subjective:  The patient feels well. She is ambulating.  She is tolerating regular diet. She denies nausea and vomiting. She is voiding. Her pain is controlled. She reports normal postpartum bleeding. She is breast and bottle feeding.    Objective:    Vital Signs Last 24 Hrs  T(C): 36.9 (24 Dec 2019 06:04), Max: 36.9 (23 Dec 2019 11:05)  T(F): 98.5 (24 Dec 2019 06:04), Max: 98.5 (24 Dec 2019 06:04)  HR: 90 (24 Dec 2019 01:44) (70 - 105)  BP: 120/70 (24 Dec 2019 01:44) (93/44 - 147/94)  BP(mean): 74 (24 Dec 2019 00:00) (57 - 102)  RR: 20 (24 Dec 2019 06:04) (16 - 25)  SpO2: 100% (24 Dec 2019 06:04) (90% - 100%)    Physical exam:  Gen: NAD  Abdomen: Soft, nontender, no distension. Firm uterine fundus at umbilicus.  Incision: Clean, dry, and intact   Pelvic: Deferred  Ext: No calf tenderness or edema bilaterally    LABS:                        10.3   18.71 )-----------( 297      ( 24 Dec 2019 06:19 )             32.8         MEDICATIONS  (STANDING):  acetaminophen   Tablet .. 975 milliGRAM(s) Oral <User Schedule>  ceFAZolin   IVPB 2000 milliGRAM(s) IV Intermittent every 8 hours  diphtheria/tetanus/pertussis (acellular) Vaccine (ADAcel) 0.5 milliLiter(s) IntraMuscular once  heparin  Injectable 49467 Unit(s) SubCutaneous every 12 hours  ibuprofen  Tablet. 600 milliGRAM(s) Oral every 6 hours  influenza   Vaccine 0.5 milliLiter(s) IntraMuscular once  ketorolac   Injectable 30 milliGRAM(s) IV Push every 6 hours  lactated ringers. 1000 milliLiter(s) (125 mL/Hr) IV Continuous <Continuous>  oxytocin Infusion 41.667 milliUNIT(s)/Min (125 mL/Hr) IV Continuous <Continuous>    MEDICATIONS  (PRN):  diphenhydrAMINE 25 milliGRAM(s) Oral every 6 hours PRN Itching  glycerin Suppository - Adult 1 Suppository(s) Rectal at bedtime PRN Constipation  lanolin Ointment 1 Application(s) Topical every 6 hours PRN Sore Nipples  magnesium hydroxide Suspension 30 milliLiter(s) Oral two times a day PRN Constipation  oxyCODONE    IR 5 milliGRAM(s) Oral every 3 hours PRN Moderate to Severe Pain (4-10)  oxyCODONE    IR 5 milliGRAM(s) Oral once PRN Moderate to Severe Pain (4-10)  simethicone 80 milliGRAM(s) Chew every 4 hours PRN Gas

## 2019-12-24 NOTE — PROGRESS NOTE ADULT - PROBLEM SELECTOR PLAN 1
- Continue regular diet.  - Increase ambulation.  - Continue motrin, tylenol, oxycodone PRN for pain control.  - F/u AM CBC    Georges PGY1

## 2019-12-24 NOTE — PROGRESS NOTE ADULT - PROBLEM SELECTOR PLAN 1
-Continue routine post operative care  -Oral pain meds for pain control.  -HSQ and ambulation for DVT ppx  -discharge planning for POD#3    N Sample-MD Tod

## 2019-12-24 NOTE — PROGRESS NOTE ADULT - SUBJECTIVE AND OBJECTIVE BOX
OB Progress Note:  Delivery, POD#1    S: 25yo POD#1 s/p LTCS . Her pain is well controlled. She is tolerating a regular diet and passing flatus. Denies N/V. Denies CP/SOB/lightheadedness/dizziness.   She is ambulating without difficulty.   Voiding spontaneously.     O:   Vital Signs Last 24 Hrs  T(C): 36.8 (24 Dec 2019 10:00), Max: 36.9 (23 Dec 2019 11:05)  T(F): 98.2 (24 Dec 2019 10:00), Max: 98.5 (24 Dec 2019 06:04)  HR: 85 (24 Dec 2019 10:00) (70 - 105)  BP: 116/52 (24 Dec 2019 10:00) (93/44 - 147/94)  BP(mean): 74 (24 Dec 2019 00:00) (57 - 102)  RR: 18 (24 Dec 2019 10:00) (16 - 25)  SpO2: 100% (24 Dec 2019 10:00) (90% - 100%)    Labs:  Blood type: B Positive  Rubella IgG: RPR: Negative                          10.3<L>   18.71<H> >-----------< 297    (  @ 06:19 )             32.8<L>                        11.7   11.67<H> >-----------< 318    (  @ 11:48 )             36.2                  PE:  General: NAD  Abdomen: Mildly distended, appropriately tender, incision c/d/i.  Extremities: No erythema, no pitting edema

## 2019-12-24 NOTE — PROGRESS NOTE ADULT - SUBJECTIVE AND OBJECTIVE BOX
Day _2__ of Anesthesia Pain Management Service    SUBJECTIVE:  Pain Scale Score	At rest: __ 	With Activity: __ 	[x ] Refer to charted pain scores    THERAPY:    s/p __3_____ mg PF morphine on _12/23/19_____      MEDICATIONS  (STANDING):  acetaminophen   Tablet .. 975 milliGRAM(s) Oral <User Schedule>  ceFAZolin   IVPB 2000 milliGRAM(s) IV Intermittent every 8 hours  diphtheria/tetanus/pertussis (acellular) Vaccine (ADAcel) 0.5 milliLiter(s) IntraMuscular once  heparin  Injectable 45362 Unit(s) SubCutaneous every 12 hours  ibuprofen  Tablet. 600 milliGRAM(s) Oral every 6 hours  influenza   Vaccine 0.5 milliLiter(s) IntraMuscular once  ketorolac   Injectable 30 milliGRAM(s) IV Push every 6 hours  lactated ringers. 1000 milliLiter(s) (125 mL/Hr) IV Continuous <Continuous>  oxytocin Infusion 41.667 milliUNIT(s)/Min (125 mL/Hr) IV Continuous <Continuous>    MEDICATIONS  (PRN):  diphenhydrAMINE 25 milliGRAM(s) Oral every 6 hours PRN Itching  glycerin Suppository - Adult 1 Suppository(s) Rectal at bedtime PRN Constipation  lanolin Ointment 1 Application(s) Topical every 6 hours PRN Sore Nipples  magnesium hydroxide Suspension 30 milliLiter(s) Oral two times a day PRN Constipation  oxyCODONE    IR 5 milliGRAM(s) Oral every 3 hours PRN Moderate to Severe Pain (4-10)  oxyCODONE    IR 5 milliGRAM(s) Oral once PRN Moderate to Severe Pain (4-10)  simethicone 80 milliGRAM(s) Chew every 4 hours PRN Gas      OBJECTIVE:    Sedation Score:	[x ] Alert	[ ] Drowsy	[ ] Arousable	[ ] Asleep	[ ] Unresponsive    Side Effects:	[ x ] None	[ ] Nausea	[ ] Vomiting	[ ] Pruritus  		  [ ] Weakness		[ ] Numbness	[ ] Other:    Vital Signs Last 24 Hrs  T(C): 36.8 (24 Dec 2019 10:00), Max: 36.9 (23 Dec 2019 11:05)  T(F): 98.2 (24 Dec 2019 10:00), Max: 98.5 (24 Dec 2019 06:04)  HR: 85 (24 Dec 2019 10:00) (70 - 105)  BP: 116/52 (24 Dec 2019 10:00) (93/44 - 147/94)  BP(mean): 74 (24 Dec 2019 00:00) (57 - 102)  RR: 18 (24 Dec 2019 10:00) (16 - 25)  SpO2: 100% (24 Dec 2019 10:00) (90% - 100%)    ASSESSMENT/ PLAN  [x ] Patient transitioned to prn analgesics  [x ] Pain management per primary service, pain service to sign off   [x ]Documentation and Verification of current medications     Comments:

## 2019-12-25 RX ORDER — NALOXONE HYDROCHLORIDE 4 MG/.1ML
0.1 SPRAY NASAL
Refills: 0 | Status: DISCONTINUED | OUTPATIENT
Start: 2019-12-25 | End: 2019-12-26

## 2019-12-25 RX ORDER — BUTORPHANOL TARTRATE 2 MG/ML
0.12 INJECTION, SOLUTION INTRAMUSCULAR; INTRAVENOUS EVERY 6 HOURS
Refills: 0 | Status: DISCONTINUED | OUTPATIENT
Start: 2019-12-25 | End: 2019-12-26

## 2019-12-25 RX ORDER — ONDANSETRON 8 MG/1
4 TABLET, FILM COATED ORAL EVERY 6 HOURS
Refills: 0 | Status: DISCONTINUED | OUTPATIENT
Start: 2019-12-25 | End: 2019-12-26

## 2019-12-25 RX ORDER — OXYCODONE HYDROCHLORIDE 5 MG/1
10 TABLET ORAL
Refills: 0 | Status: DISCONTINUED | OUTPATIENT
Start: 2019-12-25 | End: 2019-12-26

## 2019-12-25 RX ORDER — OXYCODONE HYDROCHLORIDE 5 MG/1
5 TABLET ORAL
Refills: 0 | Status: DISCONTINUED | OUTPATIENT
Start: 2019-12-25 | End: 2019-12-26

## 2019-12-25 RX ORDER — HYDROMORPHONE HYDROCHLORIDE 2 MG/ML
1 INJECTION INTRAMUSCULAR; INTRAVENOUS; SUBCUTANEOUS
Refills: 0 | Status: DISCONTINUED | OUTPATIENT
Start: 2019-12-25 | End: 2019-12-26

## 2019-12-25 RX ADMIN — Medication 975 MILLIGRAM(S): at 17:57

## 2019-12-25 RX ADMIN — Medication 600 MILLIGRAM(S): at 00:10

## 2019-12-25 RX ADMIN — Medication 600 MILLIGRAM(S): at 07:00

## 2019-12-25 RX ADMIN — OXYCODONE HYDROCHLORIDE 5 MILLIGRAM(S): 5 TABLET ORAL at 23:41

## 2019-12-25 RX ADMIN — Medication 975 MILLIGRAM(S): at 19:20

## 2019-12-25 RX ADMIN — HEPARIN SODIUM 10000 UNIT(S): 5000 INJECTION INTRAVENOUS; SUBCUTANEOUS at 01:06

## 2019-12-25 RX ADMIN — SIMETHICONE 80 MILLIGRAM(S): 80 TABLET, CHEWABLE ORAL at 14:15

## 2019-12-25 RX ADMIN — Medication 975 MILLIGRAM(S): at 00:57

## 2019-12-25 RX ADMIN — Medication 600 MILLIGRAM(S): at 06:24

## 2019-12-25 RX ADMIN — Medication 600 MILLIGRAM(S): at 14:15

## 2019-12-25 RX ADMIN — Medication 975 MILLIGRAM(S): at 01:50

## 2019-12-25 RX ADMIN — HEPARIN SODIUM 10000 UNIT(S): 5000 INJECTION INTRAVENOUS; SUBCUTANEOUS at 14:15

## 2019-12-25 NOTE — PROGRESS NOTE ADULT - SUBJECTIVE AND OBJECTIVE BOX
SUBJECTIVE:    Pain: Controlled    Complaints: None    MILESTONES:    Alert and Oriented x 3  [ x ]  Out of bed/ ambulating. [ x ]  Flatus:   Positive [ x ]  Negative [  ]  Bowel movement  [  ] Positive [  ] Negative   Voiding [x  ] Due to void [  ]   Arango/Indwelling catheter in place [  ]  Diet: Regular [ x ]  Clears [  ]  NPO [  ]    Infant feeding:  Breast [  ]   Bottle [  ]  Both [ X ]  Feeding related issues and/or concerns:      OBJECTIVE:  T(C): 36.5 (19 @ 06:00), Max: 37.3 (19 @ 14:00)  HR: 85 (19 @ 06:00) (85 - 100)  BP: 118/60 (19 @ 06:00) (118/60 - 131/62)  RR: 18 (19 @ 06:00) (18 - 18)  SpO2: 100% (19 @ 06:00) (99% - 100%)  Wt(kg): --                        10.3   18.71 )-----------( 297      ( 24 Dec 2019 06:19 )             32.8           Blood Type: B Positive    RPR: Negative          MEDICATIONS  (STANDING):  acetaminophen   Tablet .. 975 milliGRAM(s) Oral <User Schedule>  diphtheria/tetanus/pertussis (acellular) Vaccine (ADAcel) 0.5 milliLiter(s) IntraMuscular once  heparin  Injectable 25038 Unit(s) SubCutaneous every 12 hours  ibuprofen  Tablet. 600 milliGRAM(s) Oral every 6 hours  influenza   Vaccine 0.5 milliLiter(s) IntraMuscular once    MEDICATIONS  (PRN):  diphenhydrAMINE 25 milliGRAM(s) Oral every 6 hours PRN Itching  glycerin Suppository - Adult 1 Suppository(s) Rectal at bedtime PRN Constipation  lanolin Ointment 1 Application(s) Topical every 6 hours PRN Sore Nipples  magnesium hydroxide Suspension 30 milliLiter(s) Oral two times a day PRN Constipation  oxyCODONE    IR 5 milliGRAM(s) Oral every 3 hours PRN Moderate to Severe Pain (4-10)  oxyCODONE    IR 5 milliGRAM(s) Oral once PRN Moderate to Severe Pain (4-10)  simethicone 80 milliGRAM(s) Chew every 4 hours PRN Gas        ASSESSMENT:    24y     G  1    P  1001       PO Day#  2        Delivery: Primary [ X ]    Repeat [  ]       QBL - 177                                  Indication of procedure: Abnormal Fetal Status    Condition: Stable    Past Medical History significant for: HPI:  's patient is 23 y/o black female who is EGA 40 3/7  reporting of leaking of fluid since 0900 and contractions every 3 minutes. Patient reports of fetal movement.     AP complications:   - patient reports of blood pressures elevated twice in pregnancy, denies symptoms associated with PEC   Medical History: Denies  Surgical History: Knee surgery at 11 y/o  OBGYN History: Denies (23 Dec 2019 11:47)      Current Issues:    Breasts:  Soft [x  ]   Engorged [  ]  Nipples:  Abdomen: Soft [ x ]   Distended [  ] Nontender [  ]     Bowel sounds :  Present [  ]  Absent [  ]   Fundus:  Firm [x  ]  Boggy [  ]    Abdominal incision: Clean, Dry and Intact [x  ]  Staples [  ] Steri Strips [ X ] Dermabond [  ] Sutures [  ]    Patient wearing abdominal binder for support.    Vaginal: Lochia:  Heavy [  ]  Moderate [ x ]   Scant [  ]    Extremities: Edema [X  ] Negative Camille's Sign [ X ] Nontender Pascual  [ x ] Positive pedal pulses [  ]    Other relevant physical exam findings:      PLAN:    Plan: Increase ambulation, analgesia PRN and pain medication protocol standing oxycodone, ibuprofen and acetaminophen.    Diet: Regular diet    Continue routine post-operative and postpartum care.     Discharge Planning [ x ]    For discharge Today  [    ]    Consults:  Social Work [  ]  Lactation [ x ]  Other [         ]

## 2019-12-26 VITALS
TEMPERATURE: 98 F | OXYGEN SATURATION: 100 % | SYSTOLIC BLOOD PRESSURE: 134 MMHG | RESPIRATION RATE: 18 BRPM | DIASTOLIC BLOOD PRESSURE: 78 MMHG | HEART RATE: 78 BPM

## 2019-12-26 RX ORDER — FERROUS SULFATE 325(65) MG
325 TABLET ORAL DAILY
Refills: 0 | Status: DISCONTINUED | OUTPATIENT
Start: 2019-12-26 | End: 2019-12-26

## 2019-12-26 RX ORDER — OXYCODONE HYDROCHLORIDE 5 MG/1
5 TABLET ORAL
Refills: 0 | Status: DISCONTINUED | OUTPATIENT
Start: 2019-12-26 | End: 2019-12-26

## 2019-12-26 RX ORDER — ACETAMINOPHEN 500 MG
3 TABLET ORAL
Qty: 0 | Refills: 0 | DISCHARGE
Start: 2019-12-26

## 2019-12-26 RX ORDER — IBUPROFEN 200 MG
1 TABLET ORAL
Qty: 0 | Refills: 0 | DISCHARGE
Start: 2019-12-26

## 2019-12-26 RX ADMIN — Medication 325 MILLIGRAM(S): at 14:52

## 2019-12-26 RX ADMIN — OXYCODONE HYDROCHLORIDE 5 MILLIGRAM(S): 5 TABLET ORAL at 00:15

## 2019-12-26 RX ADMIN — Medication 1 TABLET(S): at 14:52

## 2019-12-26 RX ADMIN — HEPARIN SODIUM 10000 UNIT(S): 5000 INJECTION INTRAVENOUS; SUBCUTANEOUS at 03:53

## 2019-12-26 NOTE — PROGRESS NOTE ADULT - SUBJECTIVE AND OBJECTIVE BOX
Patient assessed at 0906.  Subjective  Pain: Patient states she has been taking pain medication as schedule with no relief of pain. Patient states she has not been wearing abdominal binder. Patient reports soreness along side of abdomen bilaterally near incision.   Complaints: None. Patient denies any headache, blur vision, dizziness and/or weakness/fatigue. patient reports having elevated blood pressure in office and denies having a blood pressure cuff at home.   Milestones:  Alert and orientedx3. Out of bed ambulating. Positive flatus. Positive bowel movement. Voiding.  Tolerating a regular diet. Patient denies any nausea/vomiting.   Infant feeding: mostly formula feeding unsure of breastfeeding. states she may pump and feed   Feeding related issues and/or concerns:none    OBJECTIVE:  T(C): 36.9 (19 @ 05:56), Max: 36.9 (19 @ 05:56)  HR: 78 (19 @ 05:56) (78 - 78)  BP: 134/78 (19 @ 05:56) (134/78 - 134/78)  RR: 18 (19 @ 05:56) (18 - 18)  SpO2: 100% (19 @ 05:56) (100% - 100%)  Wt(kg): --        LABS:    Blood Type: B Positive    RPR: Negative    Rubella Immune      MEDICATIONS  (STANDING):  acetaminophen   Tablet .. 975 milliGRAM(s) Oral <User Schedule>  diphtheria/tetanus/pertussis (acellular) Vaccine (ADAcel) 0.5 milliLiter(s) IntraMuscular once  ferrous    sulfate 325 milliGRAM(s) Oral daily  heparin  Injectable 28128 Unit(s) SubCutaneous every 12 hours  ibuprofen  Tablet. 600 milliGRAM(s) Oral every 6 hours  influenza   Vaccine 0.5 milliLiter(s) IntraMuscular once  prenatal multivitamin 1 Tablet(s) Oral daily    MEDICATIONS  (PRN):  diphenhydrAMINE 25 milliGRAM(s) Oral every 6 hours PRN Itching  glycerin Suppository - Adult 1 Suppository(s) Rectal at bedtime PRN Constipation  lanolin Ointment 1 Application(s) Topical every 6 hours PRN Sore Nipples  magnesium hydroxide Suspension 30 milliLiter(s) Oral two times a day PRN Constipation  oxyCODONE    IR 5 milliGRAM(s) Oral once PRN Moderate to Severe Pain (4-10)  oxyCODONE    IR 5 milliGRAM(s) Oral every 3 hours PRN Moderate to Severe Pain (4-10)  simethicone 80 milliGRAM(s) Chew every 4 hours PRN Gas        ASSESSMENT:  25y/o     Day#3    primary post-operative  section delivery for abnormal fetal status  Condition: Stable  Past Medical/Surgical/OB/GYN History significant for: knee surgery at 11y/o, gestational hypertension HELLP labs 19 WNL  Current Issues: [id number]  Lung sounds clear bilaterally.  Breasts: dillard, nontender  Nipples: intact  Abdomen: Soft, nondistended and nontender. Bowel sounds present. Fundus firm  Abdominal incision: Clean, dry and intact with steri strips       Vaginal: Lochia light rubra  Extremities: Moderate edema noted bilaterally and equal to lower extremities, nontender with no erythremic areas noted. Positive pedal pulses. No palpable veins noted  Other relevant physical exam findings: none    Plan  Abdominal binder applied and states relief of abdominal soreness. Discussed pain medication management.  Continue routine post-operative and postpartum care  Increase ambulation, analgesia PRN and pain medication protocol of standing ibuprofen and acetaminophen and oxycodone prn  Encouraged to wear abdominal binder for support and to use incentive spirometer  Discussed breast/nipple/engorgement care, breastfeeding and use of breast pump.  Discussed signs/symptoms to report due to history of gestational hypertension and home blood pressure monitoring. Prescription for electronic blood pressure cuff given.   Discharge to home today. Discussed discharge planning.

## 2020-01-07 ENCOUNTER — APPOINTMENT (OUTPATIENT)
Dept: OBGYN | Facility: CLINIC | Age: 25
End: 2020-01-07

## 2020-01-14 ENCOUNTER — APPOINTMENT (OUTPATIENT)
Dept: OBGYN | Facility: CLINIC | Age: 25
End: 2020-01-14
Payer: SELF-PAY

## 2020-01-14 VITALS
DIASTOLIC BLOOD PRESSURE: 74 MMHG | HEIGHT: 65 IN | WEIGHT: 255 LBS | SYSTOLIC BLOOD PRESSURE: 116 MMHG | BODY MASS INDEX: 42.49 KG/M2

## 2020-01-14 PROCEDURE — 0503F POSTPARTUM CARE VISIT: CPT

## 2020-01-14 NOTE — HISTORY OF PRESENT ILLNESS
[Complications:___] : no complications [Postpartum Follow Up] : postpartum follow up [Delivery Date: ___] : on [unfilled] [Primary C/S] : delivered by  section [Resumed Hatch] : has not resumed intercourse [Breastfeeding] : not currently nursing [Resumed Menses] : has not resumed her menses [Intended Contraception] : Intended Contraception: [S/Sx PP Depression] : no signs/symptoms of postpartum depression [Clean/Dry/Intact] : clean, dry and intact [Erythema] : not erythematous [Mild] : mild vaginal bleeding [Not Done] : Examination of breasts not done [Cervix Sample Taken] : cervical sample not taken for a Pap smear [Doing Well] : is doing well [No Sign of Infection] : is showing no signs of infection [Excellent Pain Control] : has excellent pain control [None] : None [de-identified] : LTCS for Cat II tracing [de-identified] : f/u in 4 weeks, [facility]  center offered

## 2020-02-11 ENCOUNTER — APPOINTMENT (OUTPATIENT)
Dept: OBGYN | Facility: CLINIC | Age: 25
End: 2020-02-11

## 2021-01-01 NOTE — OB RN TRIAGE NOTE - NS_GESTAGE_OBGYN_ALL_OB_FT
Covid testing has been completed and no covid concerns at this time. Patient is compliant wearing his facial mask and practicing social distancing.   [id number]

## 2021-07-20 ENCOUNTER — APPOINTMENT (OUTPATIENT)
Dept: OBGYN | Facility: CLINIC | Age: 26
End: 2021-07-20

## 2022-10-12 NOTE — OB PROVIDER DELIVERY SUMMARY - NSPROVIDERDELIVERYNOTE_OBGYN_ALL_OB_FT
No liveborn infant delivered from vtx presentation  thick mec  grossly nl uterus, tubes and ovaries b/l  qbl 177  ivf 1000  uop 110

## 2023-01-01 ENCOUNTER — EMERGENCY (EMERGENCY)
Facility: HOSPITAL | Age: 28
LOS: 1 days | Discharge: HOME | End: 2023-01-01
Admitting: EMERGENCY MEDICINE
Payer: MEDICAID

## 2023-01-01 VITALS
RESPIRATION RATE: 18 BRPM | TEMPERATURE: 98 F | HEART RATE: 75 BPM | SYSTOLIC BLOOD PRESSURE: 111 MMHG | DIASTOLIC BLOOD PRESSURE: 65 MMHG | OXYGEN SATURATION: 100 %

## 2023-01-01 VITALS
OXYGEN SATURATION: 100 % | RESPIRATION RATE: 16 BRPM | SYSTOLIC BLOOD PRESSURE: 147 MMHG | HEART RATE: 81 BPM | TEMPERATURE: 98 F | DIASTOLIC BLOOD PRESSURE: 70 MMHG

## 2023-01-01 DIAGNOSIS — Z98.890 OTHER SPECIFIED POSTPROCEDURAL STATES: Chronic | ICD-10-CM

## 2023-01-01 PROCEDURE — 99284 EMERGENCY DEPT VISIT MOD MDM: CPT

## 2023-01-01 RX ORDER — ACETAMINOPHEN 500 MG
975 TABLET ORAL ONCE
Refills: 0 | Status: COMPLETED | OUTPATIENT
Start: 2023-01-01 | End: 2023-01-01

## 2023-01-01 RX ORDER — SODIUM CHLORIDE 9 MG/ML
1000 INJECTION INTRAMUSCULAR; INTRAVENOUS; SUBCUTANEOUS ONCE
Refills: 0 | Status: COMPLETED | OUTPATIENT
Start: 2023-01-01 | End: 2023-01-01

## 2023-01-01 RX ORDER — METOCLOPRAMIDE HCL 10 MG
10 TABLET ORAL ONCE
Refills: 0 | Status: COMPLETED | OUTPATIENT
Start: 2023-01-01 | End: 2023-01-01

## 2023-01-01 RX ORDER — KETOROLAC TROMETHAMINE 30 MG/ML
15 SYRINGE (ML) INJECTION ONCE
Refills: 0 | Status: DISCONTINUED | OUTPATIENT
Start: 2023-01-01 | End: 2023-01-01

## 2023-01-01 RX ADMIN — Medication 15 MILLIGRAM(S): at 21:39

## 2023-01-01 RX ADMIN — SODIUM CHLORIDE 1000 MILLILITER(S): 9 INJECTION INTRAMUSCULAR; INTRAVENOUS; SUBCUTANEOUS at 21:39

## 2023-01-01 RX ADMIN — Medication 975 MILLIGRAM(S): at 21:40

## 2023-01-01 RX ADMIN — Medication 10 MILLIGRAM(S): at 21:41
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
## 2023-01-01 NOTE — ED PROVIDER NOTE - CLINICAL SUMMARY MEDICAL DECISION MAKING FREE TEXT BOX
28 y/o female c/o headache x 4 days after hitting head  -likely post concussive syndrome exacerbating migraines, low suspicion for intracranial injury/bleeding - does not warrant imaging at this time   -reglan toradol tylenol iv fluids  -reassess  -brian marquez with outpt neuro follow
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
## 2023-01-01 NOTE — ED PROVIDER NOTE - PATIENT PORTAL LINK FT
You can access the FollowMyHealth Patient Portal offered by [facility] by registering at the following website: [email or web address]. By joining [employer]’s FollowMyHealth portal, you will also be able to view your health information using other applications (apps) compatible with our system.
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
## 2023-01-04 NOTE — OB PROVIDER DELIVERY SUMMARY - NS_DELIVERYANESTHES_OBGYN_ALL_OB_FT
Liriano PAST MEDICAL HISTORY:  Bilateral cataracts     Dementia     Depression with anxiety     High cholesterol     History of chronic CHF     History of cirrhosis of liver     Hypertension     Hypothyroid     Incontinence     Melanoma of skin     Osteopenia     Peripheral arterial disease (s/p B/L leg stents & on Plavix)    Poor circulation PAD    RA (rheumatoid arthritis)     Rheumatoid arthritis

## 2023-06-20 ENCOUNTER — EMERGENCY (EMERGENCY)
Facility: HOSPITAL | Age: 28
LOS: 1 days | Discharge: LEFT BEFORE DISCHARGE | End: 2023-06-20
Admitting: EMERGENCY MEDICINE
Payer: MEDICAID

## 2023-06-20 VITALS
OXYGEN SATURATION: 100 % | TEMPERATURE: 98 F | DIASTOLIC BLOOD PRESSURE: 93 MMHG | RESPIRATION RATE: 20 BRPM | SYSTOLIC BLOOD PRESSURE: 149 MMHG | HEART RATE: 82 BPM

## 2023-06-20 DIAGNOSIS — Z98.890 OTHER SPECIFIED POSTPROCEDURAL STATES: Chronic | ICD-10-CM

## 2023-06-20 PROCEDURE — L9992: CPT

## 2023-06-20 PROCEDURE — 93010 ELECTROCARDIOGRAM REPORT: CPT

## 2023-06-20 NOTE — ED ADULT TRIAGE NOTE - CHIEF COMPLAINT QUOTE
Pt c/o intermittent tingling sensation to b/l arms/legs and left sided chest pain xfew weeks. Saw PCP on thursday with negative workup but no relief. Denies fever, chills. Denies Hx/meds. well appearing.
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
## 2023-07-21 ENCOUNTER — EMERGENCY (EMERGENCY)
Facility: HOSPITAL | Age: 28
LOS: 1 days | Discharge: HOME | End: 2023-07-21
Attending: EMERGENCY MEDICINE | Admitting: EMERGENCY MEDICINE
Payer: MEDICAID

## 2023-07-21 VITALS
TEMPERATURE: 98 F | HEART RATE: 87 BPM | RESPIRATION RATE: 18 BRPM | SYSTOLIC BLOOD PRESSURE: 125 MMHG | OXYGEN SATURATION: 100 % | DIASTOLIC BLOOD PRESSURE: 71 MMHG

## 2023-07-21 VITALS
TEMPERATURE: 99 F | HEART RATE: 78 BPM | DIASTOLIC BLOOD PRESSURE: 100 MMHG | OXYGEN SATURATION: 100 % | SYSTOLIC BLOOD PRESSURE: 155 MMHG | RESPIRATION RATE: 17 BRPM

## 2023-07-21 DIAGNOSIS — Z98.890 OTHER SPECIFIED POSTPROCEDURAL STATES: Chronic | ICD-10-CM

## 2023-07-21 LAB
ALBUMIN SERPL ELPH-MCNC: 4.1 G/DL — SIGNIFICANT CHANGE UP (ref 3.3–5)
ALP SERPL-CCNC: 49 U/L — SIGNIFICANT CHANGE UP (ref 40–120)
ALT FLD-CCNC: 17 U/L — SIGNIFICANT CHANGE UP (ref 4–33)
ANION GAP SERPL CALC-SCNC: 14 MMOL/L — SIGNIFICANT CHANGE UP (ref 7–14)
APPEARANCE UR: CLEAR — SIGNIFICANT CHANGE UP
APTT BLD: 23.1 SEC — LOW (ref 27–36.3)
AST SERPL-CCNC: 31 U/L — SIGNIFICANT CHANGE UP (ref 4–32)
BASOPHILS # BLD AUTO: 0.05 K/UL — SIGNIFICANT CHANGE UP (ref 0–0.2)
BASOPHILS NFR BLD AUTO: 0.6 % — SIGNIFICANT CHANGE UP (ref 0–2)
BILIRUB SERPL-MCNC: 0.5 MG/DL — SIGNIFICANT CHANGE UP (ref 0.2–1.2)
BILIRUB UR-MCNC: NEGATIVE — SIGNIFICANT CHANGE UP
BUN SERPL-MCNC: 8 MG/DL — SIGNIFICANT CHANGE UP (ref 7–23)
CALCIUM SERPL-MCNC: 9.5 MG/DL — SIGNIFICANT CHANGE UP (ref 8.4–10.5)
CHLORIDE SERPL-SCNC: 104 MMOL/L — SIGNIFICANT CHANGE UP (ref 98–107)
CO2 SERPL-SCNC: 19 MMOL/L — LOW (ref 22–31)
COLOR SPEC: YELLOW — SIGNIFICANT CHANGE UP
CREAT SERPL-MCNC: 0.79 MG/DL — SIGNIFICANT CHANGE UP (ref 0.5–1.3)
DIFF PNL FLD: NEGATIVE — SIGNIFICANT CHANGE UP
EGFR: 106 ML/MIN/1.73M2 — SIGNIFICANT CHANGE UP
EOSINOPHIL # BLD AUTO: 0.02 K/UL — SIGNIFICANT CHANGE UP (ref 0–0.5)
EOSINOPHIL NFR BLD AUTO: 0.2 % — SIGNIFICANT CHANGE UP (ref 0–6)
GLUCOSE SERPL-MCNC: 68 MG/DL — LOW (ref 70–99)
GLUCOSE UR QL: NEGATIVE MG/DL — SIGNIFICANT CHANGE UP
HCT VFR BLD CALC: 35.1 % — SIGNIFICANT CHANGE UP (ref 34.5–45)
HGB BLD-MCNC: 10.9 G/DL — LOW (ref 11.5–15.5)
IANC: 5.92 K/UL — SIGNIFICANT CHANGE UP (ref 1.8–7.4)
IMM GRANULOCYTES NFR BLD AUTO: 0.2 % — SIGNIFICANT CHANGE UP (ref 0–0.9)
INR BLD: 1.14 RATIO — SIGNIFICANT CHANGE UP (ref 0.88–1.16)
KETONES UR-MCNC: 40 MG/DL
LEUKOCYTE ESTERASE UR-ACNC: NEGATIVE — SIGNIFICANT CHANGE UP
LIDOCAIN IGE QN: 23 U/L — SIGNIFICANT CHANGE UP (ref 7–60)
LYMPHOCYTES # BLD AUTO: 1.38 K/UL — SIGNIFICANT CHANGE UP (ref 1–3.3)
LYMPHOCYTES # BLD AUTO: 17.1 % — SIGNIFICANT CHANGE UP (ref 13–44)
MCHC RBC-ENTMCNC: 26.3 PG — LOW (ref 27–34)
MCHC RBC-ENTMCNC: 31.1 GM/DL — LOW (ref 32–36)
MCV RBC AUTO: 84.6 FL — SIGNIFICANT CHANGE UP (ref 80–100)
MONOCYTES # BLD AUTO: 0.67 K/UL — SIGNIFICANT CHANGE UP (ref 0–0.9)
MONOCYTES NFR BLD AUTO: 8.3 % — SIGNIFICANT CHANGE UP (ref 2–14)
NEUTROPHILS # BLD AUTO: 5.92 K/UL — SIGNIFICANT CHANGE UP (ref 1.8–7.4)
NEUTROPHILS NFR BLD AUTO: 73.6 % — SIGNIFICANT CHANGE UP (ref 43–77)
NITRITE UR-MCNC: NEGATIVE — SIGNIFICANT CHANGE UP
NRBC # BLD: 0 /100 WBCS — SIGNIFICANT CHANGE UP (ref 0–0)
NRBC # FLD: 0 K/UL — SIGNIFICANT CHANGE UP (ref 0–0)
PH UR: 6 — SIGNIFICANT CHANGE UP (ref 5–8)
PLATELET # BLD AUTO: 360 K/UL — SIGNIFICANT CHANGE UP (ref 150–400)
POTASSIUM SERPL-MCNC: 5.2 MMOL/L — SIGNIFICANT CHANGE UP (ref 3.5–5.3)
POTASSIUM SERPL-SCNC: 5.2 MMOL/L — SIGNIFICANT CHANGE UP (ref 3.5–5.3)
PROT SERPL-MCNC: 8 G/DL — SIGNIFICANT CHANGE UP (ref 6–8.3)
PROT UR-MCNC: NEGATIVE MG/DL — SIGNIFICANT CHANGE UP
PROTHROM AB SERPL-ACNC: 13.3 SEC — SIGNIFICANT CHANGE UP (ref 10.5–13.4)
RBC # BLD: 4.15 M/UL — SIGNIFICANT CHANGE UP (ref 3.8–5.2)
RBC # FLD: 14.6 % — HIGH (ref 10.3–14.5)
SODIUM SERPL-SCNC: 137 MMOL/L — SIGNIFICANT CHANGE UP (ref 135–145)
SP GR SPEC: 1.03 — SIGNIFICANT CHANGE UP (ref 1–1.03)
UROBILINOGEN FLD QL: 0.2 MG/DL — SIGNIFICANT CHANGE UP (ref 0.2–1)
WBC # BLD: 8.06 K/UL — SIGNIFICANT CHANGE UP (ref 3.8–10.5)
WBC # FLD AUTO: 8.06 K/UL — SIGNIFICANT CHANGE UP (ref 3.8–10.5)

## 2023-07-21 PROCEDURE — 99285 EMERGENCY DEPT VISIT HI MDM: CPT

## 2023-07-21 PROCEDURE — 74177 CT ABD & PELVIS W/CONTRAST: CPT | Mod: 26,MA

## 2023-07-21 RX ORDER — SODIUM CHLORIDE 9 MG/ML
1000 INJECTION INTRAMUSCULAR; INTRAVENOUS; SUBCUTANEOUS ONCE
Refills: 0 | Status: COMPLETED | OUTPATIENT
Start: 2023-07-21 | End: 2023-07-21

## 2023-07-21 RX ORDER — MORPHINE SULFATE 50 MG/1
4 CAPSULE, EXTENDED RELEASE ORAL ONCE
Refills: 0 | Status: DISCONTINUED | OUTPATIENT
Start: 2023-07-21 | End: 2023-07-21

## 2023-07-21 RX ADMIN — MORPHINE SULFATE 4 MILLIGRAM(S): 50 CAPSULE, EXTENDED RELEASE ORAL at 16:51

## 2023-07-21 RX ADMIN — SODIUM CHLORIDE 1000 MILLILITER(S): 9 INJECTION INTRAMUSCULAR; INTRAVENOUS; SUBCUTANEOUS at 16:43

## 2023-07-21 NOTE — ED PROVIDER NOTE - OBJECTIVE STATEMENT
27 yo F with with no PMHx here with lower abdominal pain L>R a/w nausea and multiple episodes of diarrhea x 2 days. The pt was seen at urgent care and sent to r/o appendicitis. Denies f/c, sob, cp, vomiting, dysuria, vaginal discharge. PSHx: .  LMP: 23

## 2023-07-21 NOTE — ED ADULT NURSE NOTE - OBJECTIVE STATEMENT
Pt is alert and orientedx4, ambulatory at baseline. Pt presents to the ED with lower abdominal pain and was sent by urgent care to R/O appendicitis. Pt denies chest pain, shortness of breath, dyspnea on exertion, breathing is unlabored and even. Pt denies nausea, vomiting or diarrhea. 20G IV placed in left AC 20G labs drawn, awaiting further orders. Call bell within reach, bed in lowest position, will continue to monitor.

## 2023-07-21 NOTE — ED PROVIDER NOTE - CLINICAL SUMMARY MEDICAL DECISION MAKING FREE TEXT BOX
Will check labs including pre-surgical labs, ua, uc/s and CT abdomen/pelvis to r/o diverticulitis since the pt is having a hx of diarrhea and LLQ abdominal pain and +ttp. Consider other intra--abdominal pathology such as appendicitis, colitis, uto, pyelo, etc.   Pain control, fluids, reassess.

## 2023-07-21 NOTE — ED ADULT TRIAGE NOTE - CHIEF COMPLAINT QUOTE
c/o RLQ abd pain intermittent for 1 week, sent in by  urgent care for r/o appendicitis. denies N./v states has diarrhea with fever at home

## 2023-07-21 NOTE — ED PROVIDER NOTE - PATIENT PORTAL LINK FT
You can access the FollowMyHealth Patient Portal offered by [facility] by registering at the following website: [email or web address]. By joining "[employer]’s FollowMyHealth portal, you will also be able to view your health information using other applications (apps) compatible with our system.

## 2023-07-21 NOTE — ED ADULT NURSE NOTE - NSFALLUNIVINTERV_ED_ALL_ED
no
Bed/Stretcher in lowest position, wheels locked, appropriate side rails in place/Call bell, personal items and telephone in reach/Instruct patient to call for assistance before getting out of bed/chair/stretcher/Non-slip footwear applied when patient is off stretcher/[place] to call system/Physically safe environment - no spills, clutter or unnecessary equipment/Purposeful proactive rounding/Room/bathroom lighting operational, light cord in reach

## 2023-07-21 NOTE — ED PROVIDER NOTE - PROGRESS NOTE DETAILS
RAGHAV WARNER: patient with colitis. Hypoglycemic on CMP. Tolerating PO. Will discharge with PMD/GI followup.

## 2023-07-22 LAB
CULTURE RESULTS: SIGNIFICANT CHANGE UP
SPECIMEN SOURCE: SIGNIFICANT CHANGE UP

## 2024-07-26 ENCOUNTER — APPOINTMENT (OUTPATIENT)
Dept: ULTRASOUND IMAGING | Facility: CLINIC | Age: 29
End: 2024-07-26

## 2024-07-26 ENCOUNTER — APPOINTMENT (OUTPATIENT)
Dept: MAMMOGRAPHY | Facility: CLINIC | Age: 29
End: 2024-07-26

## 2024-07-26 PROCEDURE — 76641 ULTRASOUND BREAST COMPLETE: CPT | Mod: 50

## 2025-03-31 ENCOUNTER — NON-APPOINTMENT (OUTPATIENT)
Age: 30
End: 2025-03-31

## 2025-04-29 ENCOUNTER — NON-APPOINTMENT (OUTPATIENT)
Age: 30
End: 2025-04-29

## 2025-05-05 NOTE — OB RN PATIENT PROFILE - POST PARTUM DEPRESSION SCREEN OB 4
Tetracycline Counseling: Patient counseled regarding possible photosensitivity and increased risk for sunburn.  Patient instructed to avoid sunlight, if possible.  When exposed to sunlight, patients should wear protective clothing, sunglasses, and sunscreen.  The patient was instructed to call the office immediately if the following severe adverse effects occur:  hearing changes, easy bruising/bleeding, severe headache, or vision changes.  The patient verbalized understanding of the proper use and possible adverse effects of tetracycline.  All of the patient's questions and concerns were addressed. Patient understands to avoid pregnancy while on therapy due to potential birth defects. no

## 2025-05-14 ENCOUNTER — NON-APPOINTMENT (OUTPATIENT)
Age: 30
End: 2025-05-14

## 2025-07-22 ENCOUNTER — NON-APPOINTMENT (OUTPATIENT)
Age: 30
End: 2025-07-22